# Patient Record
Sex: FEMALE | Race: WHITE | NOT HISPANIC OR LATINO | Employment: OTHER | URBAN - METROPOLITAN AREA
[De-identification: names, ages, dates, MRNs, and addresses within clinical notes are randomized per-mention and may not be internally consistent; named-entity substitution may affect disease eponyms.]

---

## 2018-03-13 RX ORDER — OXYBUTYNIN CHLORIDE 5 MG/1
5 TABLET, EXTENDED RELEASE ORAL NIGHTLY
Qty: 30 TABLET | Refills: 5 | Status: SHIPPED | OUTPATIENT
Start: 2018-03-13 | End: 2018-04-03

## 2018-03-13 RX ORDER — OXYBUTYNIN CHLORIDE 5 MG/1
5 TABLET, EXTENDED RELEASE ORAL
COMMUNITY
Start: 2018-01-23 | End: 2018-03-13 | Stop reason: SDUPTHER

## 2018-03-27 PROBLEM — N39.41 URGE INCONTINENCE: Status: ACTIVE | Noted: 2018-03-27

## 2018-04-03 ENCOUNTER — OFFICE VISIT (OUTPATIENT)
Dept: UROGYNECOLOGY | Facility: CLINIC | Age: 83
End: 2018-04-03
Attending: NURSE PRACTITIONER
Payer: MEDICARE

## 2018-04-03 VITALS
WEIGHT: 138 LBS | DIASTOLIC BLOOD PRESSURE: 84 MMHG | HEIGHT: 62 IN | SYSTOLIC BLOOD PRESSURE: 126 MMHG | BODY MASS INDEX: 25.4 KG/M2

## 2018-04-03 DIAGNOSIS — N39.41 URGE INCONTINENCE: Primary | ICD-10-CM

## 2018-04-03 PROCEDURE — 99212 OFFICE O/P EST SF 10 MIN: CPT | Performed by: NURSE PRACTITIONER

## 2018-04-03 RX ORDER — VALSARTAN 80 MG/1
80 TABLET ORAL
COMMUNITY
End: 2018-10-27 | Stop reason: SDUPTHER

## 2018-04-03 RX ORDER — METOPROLOL TARTRATE AND HYDROCHLOROTHIAZIDE 100; 25 MG/1; MG/1
25 TABLET ORAL 2 TIMES DAILY PRN
COMMUNITY
End: 2018-10-27 | Stop reason: ENTERED-IN-ERROR

## 2018-04-03 RX ORDER — DAPAGLIFLOZIN 10 MG/1
5 TABLET, FILM COATED ORAL DAILY
COMMUNITY
End: 2019-09-27

## 2018-04-03 RX ORDER — ATORVASTATIN CALCIUM 10 MG/1
40 TABLET, FILM COATED ORAL
COMMUNITY
End: 2019-09-27 | Stop reason: SDUPTHER

## 2018-04-03 RX ORDER — OXYBUTYNIN CHLORIDE 5 MG/1
5 TABLET, EXTENDED RELEASE ORAL DAILY
Qty: 30 TABLET | Refills: 5 | Status: SHIPPED | OUTPATIENT
Start: 2018-04-03 | End: 2019-09-27

## 2018-04-03 ASSESSMENT — ENCOUNTER SYMPTOMS
FEVER: 0
JOINT SWELLING: 0
LIGHT-HEADEDNESS: 0
HALLUCINATIONS: 0
SEIZURES: 0
APPETITE CHANGE: 0
FACIAL ASYMMETRY: 0
CHILLS: 0
FATIGUE: 0
DIAPHORESIS: 0
BRUISES/BLEEDS EASILY: 0
DIARRHEA: 0
ABDOMINAL PAIN: 0
NECK PAIN: 0
POLYDIPSIA: 0
NECK STIFFNESS: 0
PALPITATIONS: 0
BREAST MASS: 0
COUGH: 0
CONSTIPATION: 0
WHEEZING: 0
AGITATION: 0
NAUSEA: 0
ADENOPATHY: 0
BREAST PAIN: 0
SHORTNESS OF BREATH: 0

## 2018-04-03 NOTE — ASSESSMENT & PLAN NOTE
Shahrzad SANTOS is doing well on current regimen.     We will continue  Oxybutynin XL 5mg. eRx sent.    Pt teaching done on bladder anatomy/function and bladder retraining techniques. Printed literature reviewed.

## 2018-04-03 NOTE — PROGRESS NOTES
"Patient ID: Shahrzad Feng   : 1928  MRN: [id number]   Visit Date: 4/3/2018    Subjective   Shahrzad Feng is presenting today for Urge Incontinenece      Shahrzad SANOTS feels she is stable since her last visit. She is currently taking Oxybutynin XL 5mg. She reports wearing 1 pads per day, nocturia x0-1, frequency x4, urgency less than once a day.    Vital Signs for this encounter: /84   Ht 1.575 m (5' 2\")   Wt 62.6 kg (138 lb)   BMI 25.24 kg/m²     The following have been marked reviewed and updated as appropriate in this visit:  Tobacco  Allergies  Meds  Problems  Med Hx  Surg Hx  Fam Hx  Soc Hx            Review of Systems   Constitutional: Negative for appetite change, chills, diaphoresis, fatigue and fever.   Respiratory: Negative for cough, shortness of breath and wheezing.    Cardiovascular: Negative for chest pain and palpitations.   Gastrointestinal: Negative for abdominal pain, constipation, diarrhea and nausea.   Endocrine: Negative for polydipsia.   Genitourinary:        See HPI   Breast: Negative for breast discharge, breast mass and breast pain.   Musculoskeletal: Negative for joint swelling, neck pain and neck stiffness.   Skin: Negative for pallor and rash.   Neurological: Negative for seizures, facial asymmetry and light-headedness.   Hematological: Negative for adenopathy. Does not bruise/bleed easily.   Psychiatric/Behavioral: Negative for agitation and hallucinations.     Physical Exam   Constitutional: She appears well-developed and well-nourished.   HENT:   Head: Normocephalic and atraumatic.   Neck: No thyromegaly present.   Cardiovascular: Normal rate, regular rhythm and normal heart sounds.    Pulmonary/Chest: Effort normal.   Skin: Skin is warm and dry.   Psychiatric: She has a normal mood and affect. Her behavior is normal. Judgment and thought content normal.   Vitals reviewed.      Impression & Plan:  Urge incontinence  Shahrzad SANTOS is doing well on current " regimen.     We will continue  Oxybutynin XL 5mg. eRx sent.    Pt teaching done on bladder anatomy/function and bladder retraining techniques. Printed literature reviewed.      Return in about 6 months (around 10/3/2018).    GA Wilson

## 2018-06-18 ENCOUNTER — APPOINTMENT (EMERGENCY)
Dept: RADIOLOGY | Facility: HOSPITAL | Age: 83
End: 2018-06-18
Attending: EMERGENCY MEDICINE
Payer: COMMERCIAL

## 2018-06-18 ENCOUNTER — HOSPITAL ENCOUNTER (EMERGENCY)
Facility: HOSPITAL | Age: 83
Discharge: HOME | End: 2018-06-18
Attending: EMERGENCY MEDICINE
Payer: COMMERCIAL

## 2018-06-18 VITALS
RESPIRATION RATE: 18 BRPM | HEART RATE: 62 BPM | WEIGHT: 131.7 LBS | DIASTOLIC BLOOD PRESSURE: 70 MMHG | BODY MASS INDEX: 24.24 KG/M2 | SYSTOLIC BLOOD PRESSURE: 168 MMHG | HEIGHT: 62 IN | OXYGEN SATURATION: 97 % | TEMPERATURE: 97.8 F

## 2018-06-18 DIAGNOSIS — R07.89 CHEST WALL PAIN: Primary | ICD-10-CM

## 2018-06-18 DIAGNOSIS — S22.20XS CLOSED FRACTURE OF STERNUM, UNSPECIFIED PORTION OF STERNUM, SEQUELA: ICD-10-CM

## 2018-06-18 DIAGNOSIS — S22.42XD CLOSED FRACTURE OF MULTIPLE RIBS OF LEFT SIDE WITH ROUTINE HEALING, SUBSEQUENT ENCOUNTER: ICD-10-CM

## 2018-06-18 PROBLEM — S22.42XA MULTIPLE CLOSED FRACTURES OF RIBS OF LEFT SIDE: Status: ACTIVE | Noted: 2018-06-18

## 2018-06-18 LAB
ALBUMIN SERPL-MCNC: 3.6 G/DL (ref 3.4–5)
ALP SERPL-CCNC: 170 IU/L (ref 35–126)
ALT SERPL-CCNC: 18 IU/L (ref 11–54)
ANION GAP SERPL CALC-SCNC: 9 MEQ/L (ref 3–15)
APTT PPP: 26 SEC. (ref 23–35)
AST SERPL-CCNC: 24 IU/L (ref 15–41)
ATRIAL RATE: 59
BASOPHILS # BLD: 0.02 K/UL (ref 0.01–0.1)
BASOPHILS NFR BLD: 0.3 %
BILIRUB SERPL-MCNC: 0.4 MG/DL (ref 0.3–1.2)
BUN SERPL-MCNC: 13 MG/DL (ref 8–20)
CALCIUM SERPL-MCNC: 8.7 MG/DL (ref 8.9–10.3)
CHLORIDE SERPL-SCNC: 100 MMOL/L (ref 98–109)
CO2 SERPL-SCNC: 25 MMOL/L (ref 22–32)
CREAT SERPL-MCNC: 0.9 MG/DL (ref 0.6–1.1)
DIFFERENTIAL METHOD BLD: NORMAL
EOSINOPHIL # BLD: 0.08 K/UL (ref 0.04–0.36)
EOSINOPHIL NFR BLD: 1.3 %
ERYTHROCYTE [DISTWIDTH] IN BLOOD BY AUTOMATED COUNT: 12.2 % (ref 11.7–14.4)
GFR SERPL CREATININE-BSD FRML MDRD: 59 ML/MIN/1.73M*2
GLUCOSE BLD-MCNC: 321 MG/DL (ref 70–99)
GLUCOSE SERPL-MCNC: 520 MG/DL (ref 70–99)
HCT VFR BLDCO AUTO: 36.5 % (ref 35–45)
HGB BLD-MCNC: 12.7 G/DL (ref 11.8–15.7)
IMM GRANULOCYTES # BLD AUTO: 0.06 K/UL (ref 0–0.08)
IMM GRANULOCYTES NFR BLD AUTO: 1 %
INR PPP: 1 INR
LYMPHOCYTES # BLD: 1.76 K/UL (ref 1.2–3.5)
LYMPHOCYTES NFR BLD: 28.3 %
MCH RBC QN AUTO: 30.2 PG (ref 28–33.2)
MCHC RBC AUTO-ENTMCNC: 34.8 G/DL (ref 32.2–35.5)
MCV RBC AUTO: 86.7 FL (ref 83–98)
MONOCYTES # BLD: 0.44 K/UL (ref 0.28–0.8)
MONOCYTES NFR BLD: 7.1 %
NEUTROPHILS # BLD: 3.87 K/UL (ref 1.7–7)
NEUTS SEG NFR BLD: 62 %
NRBC BLD-RTO: 0 %
P AXIS: -2
PDW BLD AUTO: 11.3 FL (ref 9.4–12.3)
PLATELET # BLD AUTO: 168 K/UL (ref 150–369)
POTASSIUM SERPL-SCNC: 4 MMOL/L (ref 3.6–5.1)
PR INTERVAL: 142
PROT SERPL-MCNC: 6.8 G/DL (ref 6–8.2)
PROTHROMBIN TIME: 13.1 SEC. (ref 12.2–14.5)
QRS DURATION: 94
QT INTERVAL: 446
QTC CALCULATION(BAZETT): 441
R AXIS: 66
RBC # BLD AUTO: 4.21 M/UL (ref 3.93–5.22)
SODIUM SERPL-SCNC: 134 MMOL/L (ref 136–144)
T WAVE AXIS: 87
TROPONIN I SERPL-MCNC: <0.02 NG/ML
VENTRICULAR RATE: 59
WBC # BLD AUTO: 6.23 K/UL (ref 3.8–10.5)

## 2018-06-18 PROCEDURE — 63600000 HC DRUGS/DETAIL CODE: Performed by: EMERGENCY MEDICINE

## 2018-06-18 PROCEDURE — 99284 EMERGENCY DEPT VISIT MOD MDM: CPT | Mod: 25

## 2018-06-18 PROCEDURE — 3E033GC INTRODUCTION OF OTHER THERAPEUTIC SUBSTANCE INTO PERIPHERAL VEIN, PERCUTANEOUS APPROACH: ICD-10-PCS | Performed by: EMERGENCY MEDICINE

## 2018-06-18 PROCEDURE — 85025 COMPLETE CBC W/AUTO DIFF WBC: CPT | Performed by: EMERGENCY MEDICINE

## 2018-06-18 PROCEDURE — 63600105 HC IODINE BASED CONTRAST: Performed by: EMERGENCY MEDICINE

## 2018-06-18 PROCEDURE — 96374 THER/PROPH/DIAG INJ IV PUSH: CPT | Mod: 59

## 2018-06-18 PROCEDURE — 96372 THER/PROPH/DIAG INJ SC/IM: CPT | Mod: 59

## 2018-06-18 PROCEDURE — 85730 THROMBOPLASTIN TIME PARTIAL: CPT | Performed by: EMERGENCY MEDICINE

## 2018-06-18 PROCEDURE — 84484 ASSAY OF TROPONIN QUANT: CPT | Performed by: EMERGENCY MEDICINE

## 2018-06-18 PROCEDURE — 93005 ELECTROCARDIOGRAM TRACING: CPT | Performed by: EMERGENCY MEDICINE

## 2018-06-18 PROCEDURE — 80053 COMPREHEN METABOLIC PANEL: CPT | Performed by: EMERGENCY MEDICINE

## 2018-06-18 PROCEDURE — 36415 COLL VENOUS BLD VENIPUNCTURE: CPT | Performed by: EMERGENCY MEDICINE

## 2018-06-18 PROCEDURE — 71046 X-RAY EXAM CHEST 2 VIEWS: CPT

## 2018-06-18 PROCEDURE — 3E023GC INTRODUCTION OF OTHER THERAPEUTIC SUBSTANCE INTO MUSCLE, PERCUTANEOUS APPROACH: ICD-10-PCS | Performed by: EMERGENCY MEDICINE

## 2018-06-18 PROCEDURE — 85610 PROTHROMBIN TIME: CPT | Performed by: EMERGENCY MEDICINE

## 2018-06-18 PROCEDURE — 71260 CT THORAX DX C+: CPT

## 2018-06-18 PROCEDURE — 4A02X4Z MEASUREMENT OF CARDIAC ELECTRICAL ACTIVITY, EXTERNAL APPROACH: ICD-10-PCS | Performed by: EMERGENCY MEDICINE

## 2018-06-18 RX ORDER — TRAMADOL HYDROCHLORIDE 50 MG/1
50 TABLET ORAL EVERY 8 HOURS PRN
Qty: 20 TABLET | Refills: 0 | Status: SHIPPED | OUTPATIENT
Start: 2018-06-18 | End: 2018-11-12

## 2018-06-18 RX ORDER — MORPHINE SULFATE 2 MG/ML
2 INJECTION, SOLUTION INTRAMUSCULAR; INTRAVENOUS ONCE
Status: COMPLETED | OUTPATIENT
Start: 2018-06-18 | End: 2018-06-18

## 2018-06-18 RX ADMIN — INSULIN HUMAN 12 UNITS: 100 INJECTION, SOLUTION PARENTERAL at 15:41

## 2018-06-18 RX ADMIN — IOHEXOL 100 ML: 350 INJECTION, SOLUTION INTRAVENOUS at 16:39

## 2018-06-18 RX ADMIN — MORPHINE SULFATE 2 MG: 2 INJECTION, SOLUTION INTRAMUSCULAR; INTRAVENOUS at 15:42

## 2018-06-18 ASSESSMENT — ENCOUNTER SYMPTOMS
BACK PAIN: 0
SORE THROAT: 0
SEIZURES: 0
ABDOMINAL PAIN: 0
COLOR CHANGE: 0
HEADACHES: 0
AGITATION: 0
SHORTNESS OF BREATH: 1
COUGH: 0
FEVER: 0
ACTIVITY CHANGE: 0
FACIAL SWELLING: 0
NECK PAIN: 0
HEMATURIA: 0
DIARRHEA: 0
WHEEZING: 0
DIAPHORESIS: 0
WEAKNESS: 0
DIFFICULTY URINATING: 0
VOMITING: 0
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
## 2018-06-28 ENCOUNTER — HOSPITAL ENCOUNTER (OUTPATIENT)
Dept: RADIOLOGY | Facility: HOSPITAL | Age: 83
Discharge: HOME | End: 2018-06-28
Attending: INTERNAL MEDICINE
Payer: COMMERCIAL

## 2018-06-28 ENCOUNTER — TRANSCRIBE ORDERS (OUTPATIENT)
Dept: REGISTRATION | Facility: HOSPITAL | Age: 83
End: 2018-06-28

## 2018-06-28 DIAGNOSIS — Z95.5 PRESENCE OF CORONARY ANGIOPLASTY IMPLANT AND GRAFT: Primary | ICD-10-CM

## 2018-06-28 DIAGNOSIS — I10 ESSENTIAL (PRIMARY) HYPERTENSION: ICD-10-CM

## 2018-06-28 DIAGNOSIS — Z95.5 PRESENCE OF CORONARY ANGIOPLASTY IMPLANT AND GRAFT: ICD-10-CM

## 2018-06-28 PROCEDURE — 71046 X-RAY EXAM CHEST 2 VIEWS: CPT

## 2018-10-09 ENCOUNTER — TRANSCRIBE ORDERS (OUTPATIENT)
Dept: REGISTRATION | Facility: HOSPITAL | Age: 83
End: 2018-10-09

## 2018-10-09 ENCOUNTER — HOSPITAL ENCOUNTER (OUTPATIENT)
Dept: RADIOLOGY | Facility: HOSPITAL | Age: 83
Discharge: HOME | End: 2018-10-09
Attending: NURSE PRACTITIONER
Payer: MEDICARE

## 2018-10-09 DIAGNOSIS — S81.802A OPEN WOUND OF LEFT LOWER LEG: ICD-10-CM

## 2018-10-09 DIAGNOSIS — S81.802A OPEN WOUND OF LEFT LOWER LEG: Primary | ICD-10-CM

## 2018-10-09 PROCEDURE — 73590 X-RAY EXAM OF LOWER LEG: CPT | Mod: LT

## 2018-10-26 ENCOUNTER — TRANSCRIBE ORDERS (OUTPATIENT)
Dept: SCHEDULING | Age: 83
End: 2018-10-26

## 2018-10-26 DIAGNOSIS — M86.162: Primary | ICD-10-CM

## 2018-10-29 ENCOUNTER — TRANSCRIBE ORDERS (OUTPATIENT)
Dept: LAB | Facility: HOSPITAL | Age: 83
End: 2018-10-29

## 2018-10-29 ENCOUNTER — APPOINTMENT (OUTPATIENT)
Dept: LAB | Facility: HOSPITAL | Age: 83
End: 2018-10-29
Attending: SURGERY
Payer: MEDICARE

## 2018-10-29 DIAGNOSIS — M86.162: ICD-10-CM

## 2018-10-29 DIAGNOSIS — M86.162: Primary | ICD-10-CM

## 2018-10-29 LAB
BUN SERPL-MCNC: 17 MG/DL (ref 8–20)
CREAT SERPL-MCNC: 1.2 MG/DL (ref 0.6–1.1)
GFR SERPL CREATININE-BSD FRML MDRD: 42.3 ML/MIN/1.73M*2

## 2018-10-29 PROCEDURE — 84520 ASSAY OF UREA NITROGEN: CPT

## 2018-10-29 PROCEDURE — 82565 ASSAY OF CREATININE: CPT

## 2018-10-29 PROCEDURE — 36415 COLL VENOUS BLD VENIPUNCTURE: CPT

## 2018-10-31 ENCOUNTER — HOSPITAL ENCOUNTER (OUTPATIENT)
Dept: RADIOLOGY | Facility: HOSPITAL | Age: 83
Discharge: HOME | End: 2018-10-31
Attending: SURGERY
Payer: MEDICARE

## 2018-10-31 VITALS — BODY MASS INDEX: 24.69 KG/M2 | WEIGHT: 135 LBS

## 2018-10-31 DIAGNOSIS — M86.162: ICD-10-CM

## 2018-10-31 PROCEDURE — 73720 MRI LWR EXTREMITY W/O&W/DYE: CPT | Mod: LT

## 2018-10-31 PROCEDURE — A9575 INJ GADOTERATE MEGLUMI 0.1ML: HCPCS | Mod: JW | Performed by: SURGERY

## 2018-10-31 PROCEDURE — A9579 GAD-BASE MR CONTRAST NOS,1ML: HCPCS | Mod: JW | Performed by: SURGERY

## 2018-10-31 RX ORDER — GADOTERATE MEGLUMINE 376.9 MG/ML
0.1 INJECTION INTRAVENOUS ONCE
Status: COMPLETED | OUTPATIENT
Start: 2018-10-31 | End: 2018-10-31

## 2018-10-31 RX ADMIN — GADOTERATE MEGLUMINE 12 ML: 376.9 INJECTION INTRAVENOUS at 13:20

## 2018-11-09 ENCOUNTER — HOSPITAL ENCOUNTER (EMERGENCY)
Facility: HOSPITAL | Age: 83
Discharge: HOME | End: 2018-11-09
Attending: EMERGENCY MEDICINE
Payer: MEDICARE

## 2018-11-09 VITALS
SYSTOLIC BLOOD PRESSURE: 154 MMHG | TEMPERATURE: 97.5 F | DIASTOLIC BLOOD PRESSURE: 70 MMHG | WEIGHT: 133 LBS | HEART RATE: 82 BPM | OXYGEN SATURATION: 100 % | HEIGHT: 62 IN | RESPIRATION RATE: 16 BRPM | BODY MASS INDEX: 24.48 KG/M2

## 2018-11-09 DIAGNOSIS — M79.605 LEG PAIN, ANTERIOR, LEFT: Primary | ICD-10-CM

## 2018-11-09 PROCEDURE — 96374 THER/PROPH/DIAG INJ IV PUSH: CPT

## 2018-11-09 PROCEDURE — 63600000 HC DRUGS/DETAIL CODE: Performed by: PHYSICIAN ASSISTANT

## 2018-11-09 PROCEDURE — 63700000 HC SELF-ADMINISTRABLE DRUG: Performed by: PHYSICIAN ASSISTANT

## 2018-11-09 PROCEDURE — 99284 EMERGENCY DEPT VISIT MOD MDM: CPT | Mod: 25

## 2018-11-09 PROCEDURE — 3E033NZ INTRODUCTION OF ANALGESICS, HYPNOTICS, SEDATIVES INTO PERIPHERAL VEIN, PERCUTANEOUS APPROACH: ICD-10-PCS | Performed by: EMERGENCY MEDICINE

## 2018-11-09 RX ORDER — OXYCODONE AND ACETAMINOPHEN 5; 325 MG/1; MG/1
1 TABLET ORAL ONCE
Status: COMPLETED | OUTPATIENT
Start: 2018-11-09 | End: 2018-11-09

## 2018-11-09 RX ORDER — OXYCODONE AND ACETAMINOPHEN 5; 325 MG/1; MG/1
TABLET ORAL
Status: DISCONTINUED
Start: 2018-11-09 | End: 2018-11-10 | Stop reason: HOSPADM

## 2018-11-09 RX ORDER — LORAZEPAM 2 MG/ML
0.5 INJECTION INTRAMUSCULAR ONCE
Status: COMPLETED | OUTPATIENT
Start: 2018-11-09 | End: 2018-11-09

## 2018-11-09 RX ORDER — CYCLOBENZAPRINE HCL 10 MG
10 TABLET ORAL ONCE
Status: COMPLETED | OUTPATIENT
Start: 2018-11-09 | End: 2018-11-09

## 2018-11-09 RX ORDER — CYCLOBENZAPRINE HCL 10 MG
10 TABLET ORAL 3 TIMES DAILY PRN
Qty: 15 TABLET | Refills: 0 | Status: SHIPPED | OUTPATIENT
Start: 2018-11-09 | End: 2019-09-27

## 2018-11-09 RX ORDER — CYCLOBENZAPRINE HCL 10 MG
TABLET ORAL
Status: DISCONTINUED
Start: 2018-11-09 | End: 2018-11-10 | Stop reason: HOSPADM

## 2018-11-09 RX ADMIN — LORAZEPAM 0.5 MG: 2 INJECTION INTRAMUSCULAR; INTRAVENOUS at 22:14

## 2018-11-09 RX ADMIN — CYCLOBENZAPRINE HYDROCHLORIDE 10 MG: 10 TABLET, FILM COATED ORAL at 23:02

## 2018-11-09 RX ADMIN — OXYCODONE HYDROCHLORIDE AND ACETAMINOPHEN 1 TABLET: 5; 325 TABLET ORAL at 23:02

## 2018-11-09 ASSESSMENT — ENCOUNTER SYMPTOMS
VOMITING: 0
BACK PAIN: 0
SHORTNESS OF BREATH: 0
CHILLS: 0
ABDOMINAL PAIN: 0
FEVER: 0
NAUSEA: 0
NECK PAIN: 0
WOUND: 1
HEADACHES: 0

## 2018-11-10 NOTE — ED ATTESTATION NOTE
The patient was evaluated and managed by the physician assistant / nurse practitioner.       Jazmin Corral,   11/09/18 3763

## 2018-11-10 NOTE — DISCHARGE INSTRUCTIONS
RETURN TO THE ER IF WORSE  Follow up with primary care doctor as soon as possible  Take medications as prescribed

## 2018-11-10 NOTE — ED PROVIDER NOTES
"HPI     Chief Complaint   Patient presents with   • Leg Pain       89-year-old female complaining of left lower anterior leg pain.  Patient reports she has a wound to left anterior leg that she sees wound care for but reports she is having sharp shooting pains, \"like nerve pains in spasm\" to the area around the wound. Taking percocet 5-325mg q6h, tylenol and advil at home without relief of this new spasm-like pain. Pt reports she thinks it is nerve pain because skin to L lower leg around wound is very sensitive to the touch. Denies calf pain, thigh pain, knee pain, hip pain, back pain. Denies erythema, warmth, f/c, swelling, drainage to wound. Reports \"they were digging around in it a lot at wound care center the other day\".              Patient History     Past Medical History:   Diagnosis Date   • Breast cancer (CMS/[facility]) ([facility])    • DUB (dysfunctional uterine bleeding)    • GERD (gastroesophageal reflux disease)    • Glaucoma    • Macular degeneration    • MI (myocardial infarction) (CMS/[facility]) ([facility])    • Type 2 diabetes mellitus (CMS/[facility]) ([facility])        Past Surgical History:   Procedure Laterality Date   • APPENDECTOMY     • BREAST LUMPECTOMY Left 2015   • CORONARY ANGIOPLASTY WITH STENT PLACEMENT     • HYSTERECTOMY      subtotal       Family History   Problem Relation Age of Onset   • Colon cancer Mother        Social History   Substance Use Topics   • Smoking status: Never Smoker   • Smokeless tobacco: Never Used   • Alcohol use No       Systems Reviewed from Nursing Triage:  Tobacco  Allergies  Meds  Problems  Med Hx  Surg Hx  Soc Hx         Review of Systems     Review of Systems   Constitutional: Negative for chills and fever.   Eyes: Negative for visual disturbance.   Respiratory: Negative for shortness of breath.    Cardiovascular: Negative for chest pain.   Gastrointestinal: Negative for abdominal pain, nausea and vomiting.   Musculoskeletal: Negative for back pain and neck pain.   Skin: Positive for " "wound. Negative for rash.   Neurological: Negative for syncope and headaches.        Physical Exam     ED Triage Vitals [11/09/18 2128]   Temp Heart Rate Resp BP SpO2   36.4 °C (97.5 °F) 82 16 (!) 154/70 100 %      Temp src Heart Rate Source Patient Position BP Location FiO2 (%) (Set)   -- -- -- -- --                     Patient Vitals for the past 24 hrs:   BP Temp Pulse Resp SpO2 Height Weight   11/09/18 2128 (!) 154/70 36.4 °C (97.5 °F) 82 16 100 % 1.575 m (5' 2\") 60.3 kg (133 lb)           Physical Exam   Constitutional: She is oriented to person, place, and time. She appears well-developed and well-nourished.   HENT:   Head: Normocephalic and atraumatic.   Eyes: Conjunctivae are normal. Pupils are equal, round, and reactive to light.   Neck: Normal range of motion. Neck supple.   Cardiovascular: Intact distal pulses.    Pulmonary/Chest: Effort normal. No respiratory distress.   Musculoskeletal:        Legs:  Neurological: She is alert and oriented to person, place, and time. No cranial nerve deficit or sensory deficit.   Skin: Skin is warm and dry. Capillary refill takes less than 2 seconds.   Psychiatric: She has a normal mood and affect.   Nursing note and vitals reviewed.           Procedures    ED Course & MDM     Labs Reviewed - No data to display    No orders to display               MDM           ED Course as of Nov 12 1140 Fri Nov 09, 2018   2246 Pt reports pain temporarily improved. Still with some spasms. Completely aaox3. Does not feel dizzy or sleepy after meds. Pt's daughter reports she is due for her percocet. Discussed proper med use, need for f/u and return precautions, pt and her daughter, whom she lives with, verb understanding.     [ET]      ED Course User Index  [ET] Vivian Diaz PA C         Clinical Impressions as of Nov 12 1140   Leg pain, anterior, left        Vivian Diaz PA C  11/09/18 2302       Vivian Diaz PA C  11/12/18 1140    "

## 2018-11-12 ENCOUNTER — OFFICE VISIT (OUTPATIENT)
Dept: WOUND CARE | Facility: HOSPITAL | Age: 83
End: 2018-11-12
Attending: PLASTIC SURGERY
Payer: MEDICARE

## 2018-11-12 DIAGNOSIS — S81.812A LACERATION OF LEFT LOWER EXTREMITY, INITIAL ENCOUNTER: Primary | ICD-10-CM

## 2018-11-12 PROCEDURE — 99212 OFFICE O/P EST SF 10 MIN: CPT | Performed by: PLASTIC SURGERY

## 2018-11-12 PROCEDURE — G0463 HOSPITAL OUTPT CLINIC VISIT: HCPCS

## 2018-11-12 PROCEDURE — G0463 HOSPITAL OUTPT CLINIC VISIT: HCPCS | Performed by: PLASTIC SURGERY

## 2018-11-12 RX ORDER — LINEZOLID 600 MG/1
600 TABLET, FILM COATED ORAL 2 TIMES DAILY
COMMUNITY
End: 2019-09-27

## 2018-11-12 RX ORDER — MUPIROCIN 20 MG/G
OINTMENT TOPICAL
Refills: 0 | COMMUNITY
Start: 2018-11-08 | End: 2018-12-10 | Stop reason: SDUPTHER

## 2018-11-12 RX ORDER — LINEZOLID 600 MG/1
600 TABLET, FILM COATED ORAL
Refills: 0 | COMMUNITY
Start: 2018-11-09 | End: 2018-11-12

## 2018-11-12 RX ORDER — LIDOCAINE HYDROCHLORIDE 20 MG/ML
JELLY TOPICAL
Refills: 2 | COMMUNITY
Start: 2018-11-02

## 2018-11-12 RX ORDER — OXYCODONE AND ACETAMINOPHEN 5; 325 MG/1; MG/1
1 TABLET ORAL EVERY 4 HOURS PRN
COMMUNITY
End: 2019-09-27

## 2018-11-12 RX ORDER — OXYCODONE AND ACETAMINOPHEN 5; 325 MG/1; MG/1
1 TABLET ORAL EVERY 6 HOURS PRN
Refills: 0 | COMMUNITY
Start: 2018-11-05 | End: 2018-11-12

## 2018-11-12 ASSESSMENT — ENCOUNTER SYMPTOMS: WOUND: 1

## 2018-11-12 NOTE — PATIENT INSTRUCTIONS
W[facility] Healing Center Instructions    MEDICATIONS     Medication Note  Continue present medications as prescribed by the [facility]aling Center or other physicians you see. To avoid any problems keep the [facility]aling Center informed each visit of any medications changes that occur.   Please call Dr Gonzalez office to discuss pain management.  Recommend gabapentin or lyrica, if medically appropriate, and discontinuation of oxycodone.  WOUND CARE     Clean Wound with: Soap and Water and Showering.  May get wound wet.  Do not scrub wound but allow soap and water to run over wound. Pat dry thoroughly.   Treatment 1   Location: left anterior leg  Dressing: Apply Mupirocin, adaptic, 4x4 gauze and matthew wrap.  Dressing Care Frequency: 3x per Week  Care Provider: Visiting Nurse       Basic Principles      • Wash your hands thoroughly with soap and water and after each dressing change. If someone other than the patient changes the dressing, it’s best to wear disposable gloves.   • Do not get the wound or dressing wet.   • To shower: remove the dressing, shower with soap and water (including washing the wound - do not use a washcloth), air dry, then redress the wound.  • Do not take a tub bath  • Keep all your dressings in a clean, covered container at home to avoid dust and contamination.  • Discard used dressings in a plastic bag or covered trash container.  • Check your wound and the surrounding skin at each dressing change for redness, warmth, swelling, increased pain, foul odor, fever, pus or abnormal drainage or discharge.  • Notify Wound Healing Center if any of these changes occur - Dept: [phone].        Nutrition  • Eat a well, balanced diet with adequate protein to support wound healing.   • Take a multivitamin every day. Adequate nutrition supports healing and new tissue growth.  • All diabetic patients should strive to keep blood sugars within a normal, practical range.   • Elevated blood sugars can delay your  wound healing.        ACTIVITY     Normal activity then rest and elevation  May shower  May walk    MOBILITY     independent

## 2018-11-12 NOTE — PROGRESS NOTES
Patient ID: Shahrzad Feng                              : 1928  MRN: [id number]                                            Visit Date: 2018  Encounter Provider: ALEXANDRIA Beltrán  Referring Provider: No ref. provider found    Subjective      HPI  Shahrzad Feng is a 89 y.o. old female with a chief compliant of Traumatic Wound   presenting today for : Treatment of traumatic left leg wound.  She fell out of bed 1 week ago, injuring her left leg.  She has been treated with Silvadene and now mupirocin.  She had aggressive debridement last Friday at [facility].  She was dissatisfied with that care and comes here.    The pain is better today    The following have been reviewed and updated as appropriate in this visit:       Past Medical History:  has a past medical history of Breast cancer (CMS/HCC) ([facility]); DUB (dysfunctional uterine bleeding); GERD (gastroesophageal reflux disease); Glaucoma; Macular degeneration; MI (myocardial infarction) (CMS/HCC) ([facility]); and Type 2 diabetes mellitus (CMS/HCC) ([facility]).  Past Surgical History:  has a past surgical history that includes Hysterectomy; Breast lumpectomy (Left, 2015); Appendectomy; and Coronary angioplasty with stent.  Social History:  reports that she has never smoked. She has never used smokeless tobacco. She reports that she does not drink alcohol or use drugs.  Family History: family history includes Colon cancer in her mother.  Medications:   Current Outpatient Prescriptions:   •  atorvastatin (LIPITOR) 10 mg tablet, 20 mg., Disp: , Rfl:   •  cyclobenzaprine (FLEXERIL) 10 mg tablet, Take 1 tablet (10 mg total) by mouth 3 (three) times a day as needed for muscle spasms. No driving or operating heavy machinery if taking., Disp: 15 tablet, Rfl: 0  •  dapagliflozin (FARXIGA) 10 mg tablet, 5 mg daily. , Disp: , Rfl:   •  lidocaine (XYLOCAINE) 2 % jelly, APPLY TO LEFT LEG WOUND DAILY AS DIRECTED, Disp: , Rfl: 2  •  metoprolol tartrate  (LOPRESSOR) 25 mg tablet, Take 25 mg by mouth 2 (two) times a day., Disp: , Rfl:   •  mupirocin (BACTROBAN) 2 % ointment, APPLY TO AFFECTED AREA TWICE A DAY, Disp: , Rfl: 0  •  oxybutynin XL (DITROPAN-XL) 5 mg 24 hr tablet, Take 1 tablet (5 mg total) by mouth daily. For bladder symptoms, Disp: 30 tablet, Rfl: 5  •  pantoprazole (PROTONIX) 40 mg EC tablet, Take 40 mg by mouth daily., Disp: , Rfl:   •  sitaGLIPtin-metformin (JANUMET) 50-1,000 mg per tablet, daily. JANUMET  100/1000 MG , Disp: , Rfl:   •  valsartan (DIOVAN) 80 mg tablet, Take 80 mg by mouth daily., Disp: , Rfl:     Allergies: is allergic to bacitracin; ciprofloxacin; and penicillin.     Review of Systems   Skin: Positive for wound.   All other systems reviewed and are negative.    Objective   There were no vitals taken for this visit.    Physical Exam   Constitutional: She is oriented to person, place, and time. She appears well-developed and well-nourished.   HENT:   Head: Normocephalic.   Eyes: EOM are normal. Pupils are equal, round, and reactive to light.   Neck: Neck supple.   Cardiovascular: Normal rate.    Pulmonary/Chest: Effort normal.   Musculoskeletal: Normal range of motion. She exhibits tenderness.   Neurological: She is alert and oriented to person, place, and time.   Skin: Skin is warm.   Psychiatric: She has a normal mood and affect.     Relatively clean wound of anterior left leg.  No periwound erythema.  No swelling or calf pain    Assessment/Plan    Diagnosis Plan   1. Laceration of left lower extremity, initial encounter       Problem List Items Addressed This Visit     Laceration of left lower extremity - Primary      Left traumatic, anterior leg wound will be treated with daily dressing changes of mupirocin and gauze    No Follow-up on file.     ALEXANDRIA Beltrán MD

## 2018-11-19 ENCOUNTER — OFFICE VISIT (OUTPATIENT)
Dept: WOUND CARE | Facility: HOSPITAL | Age: 83
End: 2018-11-19
Attending: PLASTIC SURGERY
Payer: MEDICARE

## 2018-11-19 VITALS — TEMPERATURE: 98.4 F

## 2018-11-19 DIAGNOSIS — S81.812A LACERATION OF LEFT LOWER EXTREMITY, INITIAL ENCOUNTER: Primary | ICD-10-CM

## 2018-11-19 PROCEDURE — G0463 HOSPITAL OUTPT CLINIC VISIT: HCPCS

## 2018-11-19 PROCEDURE — 99213 OFFICE O/P EST LOW 20 MIN: CPT | Performed by: PLASTIC SURGERY

## 2018-11-19 RX ORDER — GABAPENTIN 100 MG/1
CAPSULE ORAL AS NEEDED
Refills: 1 | COMMUNITY
Start: 2018-11-13

## 2018-11-19 ASSESSMENT — ENCOUNTER SYMPTOMS: WOUND: 1

## 2018-11-19 NOTE — PROGRESS NOTES
Patient ID: Shahrzad Feng                              : 1928  MRN: [id number]                                            Visit Date: 2018  Encounter Provider: ALEXANDRIA Beltrán  Referring Provider: No ref. provider found    Subjective      HPI  Shahrzad Feng is a 89 y.o. old female with a chief compliant of No chief complaint on file.   presenting today for : Treatment of traumatic left leg wound with daily dressing changes of mupirocin paste    The following have been reviewed and updated as appropriate in this visit:       Past Medical History:  has a past medical history of Breast cancer (CMS/HCC) ([facility]); DUB (dysfunctional uterine bleeding); GERD (gastroesophageal reflux disease); Glaucoma; Macular degeneration; MI (myocardial infarction) (CMS/HCC) ([facility]); and Type 2 diabetes mellitus (CMS/HCC) ([facility]).  Past Surgical History:  has a past surgical history that includes Hysterectomy; Breast lumpectomy (Left, 2015); Appendectomy; and Coronary angioplasty with stent.  Social History:  reports that she has never smoked. She has never used smokeless tobacco. She reports that she does not drink alcohol or use drugs.  Family History: family history includes Colon cancer in her mother.  Medications:   Current Outpatient Prescriptions:   •  atorvastatin (LIPITOR) 10 mg tablet, 20 mg., Disp: , Rfl:   •  cyclobenzaprine (FLEXERIL) 10 mg tablet, Take 1 tablet (10 mg total) by mouth 3 (three) times a day as needed for muscle spasms. No driving or operating heavy machinery if taking., Disp: 15 tablet, Rfl: 0  •  dapagliflozin (FARXIGA) 10 mg tablet, 5 mg daily. , Disp: , Rfl:   •  lidocaine (XYLOCAINE) 2 % jelly, APPLY TO LEFT LEG WOUND DAILY AS DIRECTED, Disp: , Rfl: 2  •  linezolid (ZYVOX) 600 mg tablet, Take 600 mg by mouth 2 (two) times a day., Disp: , Rfl:   •  metoprolol tartrate (LOPRESSOR) 25 mg tablet, Take 25 mg by mouth 2 (two) times a day., Disp: , Rfl:   •  mupirocin (BACTROBAN) 2 %  ointment, APPLY TO AFFECTED AREA TWICE A DAY, Disp: , Rfl: 0  •  oxybutynin XL (DITROPAN-XL) 5 mg 24 hr tablet, Take 1 tablet (5 mg total) by mouth daily. For bladder symptoms, Disp: 30 tablet, Rfl: 5  •  oxyCODONE-acetaminophen (PERCOCET) 5-325 mg per tablet, Take 1 tablet by mouth every 4 (four) hours as needed for moderate pain., Disp: , Rfl:   •  pantoprazole (PROTONIX) 40 mg EC tablet, Take 40 mg by mouth daily., Disp: , Rfl:   •  sitaGLIPtin-metformin (JANUMET) 50-1,000 mg per tablet, daily. JANUMET  100/1000 MG , Disp: , Rfl:   •  valsartan (DIOVAN) 80 mg tablet, Take 80 mg by mouth daily., Disp: , Rfl:     Allergies: is allergic to bacitracin; ciprofloxacin; and penicillin.     Review of Systems   Skin: Positive for wound.   All other systems reviewed and are negative.    Objective   There were no vitals taken for this visit.    Physical Exam   Constitutional: She is oriented to person, place, and time. She appears well-developed and well-nourished.   HENT:   Head: Normocephalic.   Eyes: Conjunctivae are normal. Pupils are equal, round, and reactive to light.   Neck: Neck supple.   Cardiovascular: Normal rate and intact distal pulses.    Pulmonary/Chest: Effort normal.   Abdominal: Soft.   Musculoskeletal: Normal range of motion.   Neurological: She is alert and oriented to person, place, and time.   Skin: Skin is warm.   Psychiatric: She has a normal mood and affect.     Wounds are clean up very well in the anterior left leg wounds.  No sign of infection      Assessment/Plan    Diagnosis Plan   1. Laceration of left lower extremity, initial encounter       Problem List Items Addressed This Visit     Laceration of left lower extremity - Primary      Continue with daily dressing changes of antibiotic ointment    No Follow-up on file.     ALEXANDRIA Beltrán MD

## 2018-11-19 NOTE — PATIENT INSTRUCTIONS
Wound Healing Center Instructions    MEDICATIONS     Medication Note  Continue present medications as prescribed by the Wound Healing Center or other physicians you see. To avoid any problems keep the Wound Healing Center informed each visit of any medications changes that occur.   Please call Dr Gonzalez office to discuss pain management.  Recommend gabapentin or lyrica, if medically appropriate, and discontinuation of oxycodone.  WOUND CARE     Clean Wound with: Soap and Water and Showering.  May get wound wet.  Do not scrub wound but allow soap and water to run over wound. Pat dry thoroughly.   Treatment 1   Location: left anterior leg  Dressing: Apply Mupirocin, adaptic, 4x4 gauze and matthew wrap.  Dressing Care Frequency: 3x per Week  Care Provider: Visiting Nurse       Basic Principles      • Wash your hands thoroughly with soap and water and after each dressing change. If someone other than the patient changes the dressing, it’s best to wear disposable gloves.   • Do not get the wound or dressing wet.   • To shower: remove the dressing, shower with soap and water (including washing the wound - do not use a washcloth), air dry, then redress the wound.  • Do not take a tub bath  • Keep all your dressings in a clean, covered container at home to avoid dust and contamination.  • Discard used dressings in a plastic bag or covered trash container.  • Check your wound and the surrounding skin at each dressing change for redness, warmth, swelling, increased pain, foul odor, fever, pus or abnormal drainage or discharge.  • Notify Wound Healing Center if any of these changes occur - Dept: [phone].        Nutrition  • Eat a well, balanced diet with adequate protein to support wound healing.   • Take a multivitamin every day. Adequate nutrition supports healing and new tissue growth.  • All diabetic patients should strive to keep blood sugars within a normal, practical range.   • Elevated blood sugars can delay your  wound healing.        ACTIVITY     Normal activity then rest and elevation  May shower  May walk    MOBILITY     independent

## 2018-12-10 ENCOUNTER — OFFICE VISIT (OUTPATIENT)
Dept: WOUND CARE | Facility: HOSPITAL | Age: 83
End: 2018-12-10
Attending: PLASTIC SURGERY
Payer: MEDICARE

## 2018-12-10 VITALS — RESPIRATION RATE: 16 BRPM | TEMPERATURE: 98 F | HEART RATE: 68 BPM

## 2018-12-10 DIAGNOSIS — S81.812D LACERATION OF LEFT LOWER EXTREMITY, SUBSEQUENT ENCOUNTER: Primary | ICD-10-CM

## 2018-12-10 PROCEDURE — G0463 HOSPITAL OUTPT CLINIC VISIT: HCPCS

## 2018-12-10 PROCEDURE — 99212 OFFICE O/P EST SF 10 MIN: CPT | Performed by: PLASTIC SURGERY

## 2018-12-10 RX ORDER — MUPIROCIN 20 MG/G
OINTMENT TOPICAL DAILY
Qty: 22 G | Refills: 2 | Status: SHIPPED | OUTPATIENT
Start: 2018-12-10 | End: 2018-12-24

## 2018-12-10 ASSESSMENT — ENCOUNTER SYMPTOMS: WOUND: 1

## 2018-12-10 NOTE — PATIENT INSTRUCTIONS
Wound Healing Center Instructions    MEDICATIONS     Medication Note  Continue present medications as prescribed by the Wound Healing Center or other physicians you see. To avoid any problems keep the Wound Healing Center informed each visit of any medications changes that occur.   Please call Dr Gonzalez office to discuss pain management.  Recommend gabapentin or lyrica, if medically appropriate, and discontinuation of oxycodone.  WOUND CARE     Clean Wound with: Soap and Water and Showering.  May get wound wet.   Treatment 1   Location: left anterior leg  Dressing: Apply Mupirocin, SINGLE LAYER adaptic, 4x4 gauze and matthew wrap.  Dressing Care Frequency: 3x per Week  Care Provider: Visiting Nurse       Basic Principles      • Wash your hands thoroughly with soap and water and after each dressing change. If someone other than the patient changes the dressing, it’s best to wear disposable gloves.   • Do not get the wound or dressing wet.   • To shower: remove the dressing, shower with soap and water (including washing the wound - do not use a washcloth), air dry, then redress the wound.  • Do not take a tub bath  • Keep all your dressings in a clean, covered container at home to avoid dust and contamination.  • Discard used dressings in a plastic bag or covered trash container.  • Check your wound and the surrounding skin at each dressing change for redness, warmth, swelling, increased pain, foul odor, fever, pus or abnormal drainage or discharge.  • Notify Wound Healing Center if any of these changes occur - Dept: [phone].        Nutrition  • Eat a well, balanced diet with adequate protein to support wound healing.   • Take a multivitamin every day. Adequate nutrition supports healing and new tissue growth.  • All diabetic patients should strive to keep blood sugars within a normal, practical range.   • Elevated blood sugars can delay your wound healing.        ACTIVITY     Normal activity then rest and  elevation  May shower  May walk    MOBILITY     independent

## 2018-12-10 NOTE — PROGRESS NOTES
Patient ID: Shahrzad Feng                              : 1928  MRN: [id number]                                            Visit Date: 12/10/2018  Encounter Provider: ALEXANDRIA Beltrán  Referring Provider: No ref. provider found    Subjective      HPI  Shahrzad Feng is a 89 y.o. old female with a chief compliant of No chief complaint on file.   presenting today for: Treatment of traumatic left leg wound with mupirocin ointment    The following have been reviewed and updated as appropriate in this visit:       Past Medical History:  has a past medical history of Breast cancer (CMS/HCC) ([facility]); DUB (dysfunctional uterine bleeding); GERD (gastroesophageal reflux disease); Glaucoma; Macular degeneration; MI (myocardial infarction) (CMS/HCC) ([facility]); and Type 2 diabetes mellitus (CMS/HCC) ([facility]).  Past Surgical History:  has a past surgical history that includes Hysterectomy; Breast lumpectomy (Left, 2015); Appendectomy; and Coronary angioplasty with stent.  Social History:  reports that she has never smoked. She has never used smokeless tobacco. She reports that she does not drink alcohol or use drugs.  Family History: family history includes Colon cancer in her mother.  Medications:   Current Outpatient Prescriptions:   •  atorvastatin (LIPITOR) 10 mg tablet, 20 mg., Disp: , Rfl:   •  cyclobenzaprine (FLEXERIL) 10 mg tablet, Take 1 tablet (10 mg total) by mouth 3 (three) times a day as needed for muscle spasms. No driving or operating heavy machinery if taking., Disp: 15 tablet, Rfl: 0  •  dapagliflozin (FARXIGA) 10 mg tablet, 5 mg daily. , Disp: , Rfl:   •  gabapentin (NEURONTIN) 100 mg capsule, TAKE 1 CAPSULE ORALLY 3 TIMES A DAY AS NEEDED, Disp: , Rfl: 1  •  lidocaine (XYLOCAINE) 2 % jelly, APPLY TO LEFT LEG WOUND DAILY AS DIRECTED, Disp: , Rfl: 2  •  linezolid (ZYVOX) 600 mg tablet, Take 600 mg by mouth 2 (two) times a day., Disp: , Rfl:   •  metoprolol tartrate (LOPRESSOR) 25 mg tablet, Take 25 mg  by mouth 2 (two) times a day., Disp: , Rfl:   •  mupirocin (BACTROBAN) 2 % ointment, APPLY TO AFFECTED AREA TWICE A DAY, Disp: , Rfl: 0  •  oxybutynin XL (DITROPAN-XL) 5 mg 24 hr tablet, Take 1 tablet (5 mg total) by mouth daily. For bladder symptoms, Disp: 30 tablet, Rfl: 5  •  oxyCODONE-acetaminophen (PERCOCET) 5-325 mg per tablet, Take 1 tablet by mouth every 4 (four) hours as needed for moderate pain., Disp: , Rfl:   •  pantoprazole (PROTONIX) 40 mg EC tablet, Take 40 mg by mouth daily., Disp: , Rfl:   •  sitaGLIPtin-metformin (JANUMET) 50-1,000 mg per tablet, daily. JANUMET  100/1000 MG , Disp: , Rfl:   •  valsartan (DIOVAN) 80 mg tablet, Take 80 mg by mouth daily., Disp: , Rfl:     Allergies: is allergic to bacitracin; ciprofloxacin; and penicillin.     Review of Systems   Skin: Positive for wound.   All other systems reviewed and are negative.    Objective   There were no vitals taken for this visit.    Physical Exam   Constitutional: She is oriented to person, place, and time. She appears well-developed and well-nourished.   HENT:   Head: Normocephalic.   Eyes: Conjunctivae are normal. Pupils are equal, round, and reactive to light.   Neck: Neck supple.   Cardiovascular: Normal rate.    Pulmonary/Chest: Effort normal.   Abdominal: Soft.   Musculoskeletal: Normal range of motion.   Neurological: She is alert and oriented to person, place, and time.   Skin: Skin is warm.   Psychiatric: She has a normal mood and affect.     Left anterior leg wound before debridement    Wound after debridement.  Almost healed      Assessment/Plan    Diagnosis Plan   1. Laceration of left lower extremity, subsequent encounter       Problem List Items Addressed This Visit     Laceration of left lower extremity - Primary      Left anterior leg wound treated with mupirocin ointment    No Follow-up on file.     ALEXANDRIA Beltrán MD

## 2018-12-31 ENCOUNTER — OFFICE VISIT (OUTPATIENT)
Dept: WOUND CARE | Facility: HOSPITAL | Age: 83
End: 2018-12-31
Attending: PLASTIC SURGERY
Payer: MEDICARE

## 2018-12-31 DIAGNOSIS — S81.812D LACERATION OF LEFT LOWER EXTREMITY, SUBSEQUENT ENCOUNTER: Primary | ICD-10-CM

## 2018-12-31 PROCEDURE — G0463 HOSPITAL OUTPT CLINIC VISIT: HCPCS

## 2018-12-31 PROCEDURE — A6212 FOAM DRG <=16 SQ IN W/BORDER: HCPCS

## 2018-12-31 PROCEDURE — 99213 OFFICE O/P EST LOW 20 MIN: CPT | Performed by: PLASTIC SURGERY

## 2018-12-31 ASSESSMENT — ENCOUNTER SYMPTOMS: WOUND: 1

## 2018-12-31 NOTE — PATIENT INSTRUCTIONS
Wound Healing Center Instructions    MEDICATIONS     Medication Note  Continue present medications as prescribed by the Wound Healing Center or other physicians you see. To avoid any problems keep the Wound Healing Center informed each visit of any medications changes that occur.   Please call Dr Gonzalez office to discuss pain management.  Recommend gabapentin or lyrica, if medically appropriate, and discontinuation of oxycodone.  WOUND CARE     Clean Wound with: Soap and Water and Showering.  May get wound wet.   Treatment 1   Location: left anterior leg  Dressing: Apply Mupirocin, and a bandage for one-2 more weeks.  Dressing Care Frequency: 3x per Week  Care Provider: family    MAY RESUME PHYSICAL THERAPY FROM A WOUND CARE PERSPECTIVE.         Basic Principles      • Wash your hands thoroughly with soap and water and after each dressing change. If someone other than the patient changes the dressing, it’s best to wear disposable gloves.   • Do not get the wound or dressing wet.   • To shower: remove the dressing, shower with soap and water (including washing the wound - do not use a washcloth), air dry, then redress the wound.  • Do not take a tub bath  • Keep all your dressings in a clean, covered container at home to avoid dust and contamination.  • Discard used dressings in a plastic bag or covered trash container.  • Check your wound and the surrounding skin at each dressing change for redness, warmth, swelling, increased pain, foul odor, fever, pus or abnormal drainage or discharge.  • Notify Wound Healing Center if any of these changes occur - Dept: [phone].        Nutrition  • Eat a well, balanced diet with adequate protein to support wound healing.   • Take a multivitamin every day. Adequate nutrition supports healing and new tissue growth.  • All diabetic patients should strive to keep blood sugars within a normal, practical range.   • Elevated blood sugars can delay your wound healing.        ACTIVITY      Normal activity then rest and elevation  May shower  May walk    MOBILITY     independent

## 2018-12-31 NOTE — PROGRESS NOTES
Patient ID: Shahrzad Feng                              : 1928  MRN: [id number]                                            Visit Date: 2018  Encounter Provider: ALEXANDRIA Beltrán  Referring Provider: No ref. provider found    Subjective      HPI  Shahrzad Feng is a 89 y.o. old female with a chief compliant of No chief complaint on file.   presenting today for: Treatment traumatic left leg wound with mupirocin ointment    The following have been reviewed and updated as appropriate in this visit:       Past Medical History:  has a past medical history of Breast cancer (CMS/HCC) ([facility]); DUB (dysfunctional uterine bleeding); GERD (gastroesophageal reflux disease); Glaucoma; Macular degeneration; MI (myocardial infarction) (CMS/HCC) ([facility]); and Type 2 diabetes mellitus (CMS/HCC) ([facility]).  Past Surgical History:  has a past surgical history that includes Hysterectomy; Breast lumpectomy (Left, 2015); Appendectomy; and Coronary angioplasty with stent.  Social History:  reports that she has never smoked. She has never used smokeless tobacco. She reports that she does not drink alcohol or use drugs.  Family History: family history includes Colon cancer in her mother.  Medications:   Current Outpatient Prescriptions:   •  atorvastatin (LIPITOR) 10 mg tablet, 20 mg., Disp: , Rfl:   •  cyclobenzaprine (FLEXERIL) 10 mg tablet, Take 1 tablet (10 mg total) by mouth 3 (three) times a day as needed for muscle spasms. No driving or operating heavy machinery if taking., Disp: 15 tablet, Rfl: 0  •  dapagliflozin (FARXIGA) 10 mg tablet, 5 mg daily. , Disp: , Rfl:   •  gabapentin (NEURONTIN) 100 mg capsule, TAKE 1 CAPSULE ORALLY 3 TIMES A DAY AS NEEDED, Disp: , Rfl: 1  •  lidocaine (XYLOCAINE) 2 % jelly, APPLY TO LEFT LEG WOUND DAILY AS DIRECTED, Disp: , Rfl: 2  •  linezolid (ZYVOX) 600 mg tablet, Take 600 mg by mouth 2 (two) times a day., Disp: , Rfl:   •  metoprolol tartrate (LOPRESSOR) 25 mg tablet, Take 25 mg by  mouth 2 (two) times a day., Disp: , Rfl:   •  oxybutynin XL (DITROPAN-XL) 5 mg 24 hr tablet, Take 1 tablet (5 mg total) by mouth daily. For bladder symptoms, Disp: 30 tablet, Rfl: 5  •  oxyCODONE-acetaminophen (PERCOCET) 5-325 mg per tablet, Take 1 tablet by mouth every 4 (four) hours as needed for moderate pain., Disp: , Rfl:   •  pantoprazole (PROTONIX) 40 mg EC tablet, Take 40 mg by mouth daily., Disp: , Rfl:   •  sitaGLIPtin-metformin (JANUMET) 50-1,000 mg per tablet, daily. JANUMET  100/1000 MG , Disp: , Rfl:   •  valsartan (DIOVAN) 80 mg tablet, Take 80 mg by mouth daily., Disp: , Rfl:     Allergies: is allergic to bacitracin; ciprofloxacin; and penicillin.     Review of Systems   Skin: Positive for wound.   All other systems reviewed and are negative.    Objective   There were no vitals taken for this visit.    Physical Exam   Constitutional: She is oriented to person, place, and time. She appears well-developed and well-nourished.   HENT:   Head: Normocephalic.   Eyes: Conjunctivae are normal. Pupils are equal, round, and reactive to light.   Neck: Neck supple.   Cardiovascular: Normal rate and intact distal pulses.    Pulmonary/Chest: Effort normal.   Abdominal: Soft.   Musculoskeletal: Normal range of motion.   Neurological: She is alert and oriented to person, place, and time.   Skin: Skin is warm.   Psychiatric: She has a normal mood and affect.     Left anterior leg wound, before debridement    Left anterior leg wound, post debridement.  Essentially healed       Assessment/Plan    Diagnosis Plan   1. Laceration of left lower extremity, subsequent encounter       Problem List Items Addressed This Visit     Laceration of left lower extremity - Primary      Continue antibiotic ointment for now (to keep soft), with Band-Aid and return on as needed basis    No Follow-up on file.     ALEXANDRIA Beltrán MD

## 2019-01-09 ENCOUNTER — APPOINTMENT (EMERGENCY)
Dept: RADIOLOGY | Facility: HOSPITAL | Age: 84
End: 2019-01-09
Attending: EMERGENCY MEDICINE
Payer: MEDICARE

## 2019-01-09 ENCOUNTER — HOSPITAL ENCOUNTER (EMERGENCY)
Facility: HOSPITAL | Age: 84
Discharge: HOME | End: 2019-01-09
Attending: EMERGENCY MEDICINE | Admitting: EMERGENCY MEDICINE
Payer: MEDICARE

## 2019-01-09 VITALS
DIASTOLIC BLOOD PRESSURE: 83 MMHG | SYSTOLIC BLOOD PRESSURE: 173 MMHG | HEART RATE: 66 BPM | OXYGEN SATURATION: 96 % | WEIGHT: 133 LBS | BODY MASS INDEX: 24.48 KG/M2 | TEMPERATURE: 98.1 F | HEIGHT: 62 IN | RESPIRATION RATE: 16 BRPM
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
## 2019-01-09 DIAGNOSIS — S09.90XA HEAD INJURY, INITIAL ENCOUNTER: Primary | ICD-10-CM

## 2019-01-09 PROCEDURE — 70450 CT HEAD/BRAIN W/O DYE: CPT

## 2019-01-09 PROCEDURE — 99284 EMERGENCY DEPT VISIT MOD MDM: CPT | Mod: 25
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
## 2019-01-09 NOTE — ED ATTESTATION NOTE
The patient was evaluated and managed by the physician assistant / nurse practitioner.     Trevor Hall MD  01/09/19 9850

## 2019-01-09 NOTE — DISCHARGE INSTRUCTIONS
Your CT scan and exam appear normal today  Return to ER if you develop severe headache, repetitive vomiting, confusion, lethargy or any other concerning symptoms  Have a happy 89th birthday!!!

## 2019-01-10 ENCOUNTER — APPOINTMENT (OUTPATIENT)
Dept: LAB | Facility: HOSPITAL | Age: 84
End: 2019-01-10
Attending: NURSE PRACTITIONER
Payer: MEDICARE

## 2019-01-10 ENCOUNTER — TRANSCRIBE ORDERS (OUTPATIENT)
Dept: REGISTRATION | Facility: HOSPITAL | Age: 84
End: 2019-01-10

## 2019-01-10 ENCOUNTER — HOSPITAL ENCOUNTER (OUTPATIENT)
Dept: RADIOLOGY | Facility: HOSPITAL | Age: 84
Discharge: HOME | End: 2019-01-10
Attending: NURSE PRACTITIONER
Payer: MEDICARE

## 2019-01-10 DIAGNOSIS — R06.02 SHORTNESS OF BREATH: ICD-10-CM

## 2019-01-10 DIAGNOSIS — R42 DIZZINESS AND GIDDINESS: Primary | ICD-10-CM

## 2019-01-10 DIAGNOSIS — R42 DIZZINESS AND GIDDINESS: ICD-10-CM

## 2019-01-10 DIAGNOSIS — R73.9 HYPERGLYCEMIA: Primary | ICD-10-CM

## 2019-01-10 LAB
ALBUMIN SERPL-MCNC: 3.9 G/DL (ref 3.4–5)
ALP SERPL-CCNC: 122 IU/L (ref 35–126)
ALT SERPL-CCNC: 19 IU/L (ref 11–54)
ANION GAP SERPL CALC-SCNC: 8 MEQ/L (ref 3–15)
AST SERPL-CCNC: 19 IU/L (ref 15–41)
BASOPHILS # BLD: 0.02 K/UL (ref 0.01–0.1)
BASOPHILS NFR BLD: 0.3 %
BILIRUB SERPL-MCNC: 0.6 MG/DL (ref 0.3–1.2)
BUN SERPL-MCNC: 19 MG/DL (ref 8–20)
CALCIUM SERPL-MCNC: 9 MG/DL (ref 8.9–10.3)
CHLORIDE SERPL-SCNC: 97 MEQ/L (ref 98–109)
CO2 SERPL-SCNC: 27 MEQ/L (ref 22–32)
CREAT SERPL-MCNC: 0.8 MG/DL
DIFFERENTIAL METHOD BLD: NORMAL
EOSINOPHIL # BLD: 0.11 K/UL (ref 0.04–0.36)
EOSINOPHIL NFR BLD: 1.5 %
ERYTHROCYTE [DISTWIDTH] IN BLOOD BY AUTOMATED COUNT: 12.3 % (ref 11.7–14.4)
EST. AVERAGE GLUCOSE BLD GHB EST-MCNC: 315 MG/DL
GFR SERPL CREATININE-BSD FRML MDRD: >60 ML/MIN/1.73M*2
GLUCOSE SERPL-MCNC: 505 MG/DL (ref 70–99)
HBA1C MFR BLD HPLC: 12.6 %
HCT VFR BLDCO AUTO: 40.5 %
HGB BLD-MCNC: 13.5 G/DL
IMM GRANULOCYTES # BLD AUTO: 0.07 K/UL (ref 0–0.08)
IMM GRANULOCYTES NFR BLD AUTO: 0.9 %
LYMPHOCYTES # BLD: 1.6 K/UL (ref 1.2–3.5)
LYMPHOCYTES NFR BLD: 21.5 %
MAGNESIUM SERPL-MCNC: 2 MG/DL (ref 1.8–2.5)
MCH RBC QN AUTO: 30.2 PG (ref 28–33.2)
MCHC RBC AUTO-ENTMCNC: 33.3 G/DL (ref 32.2–35.5)
MCV RBC AUTO: 90.6 FL (ref 83–98)
MONOCYTES # BLD: 0.4 K/UL (ref 0.28–0.8)
MONOCYTES NFR BLD: 5.4 %
NEUTROPHILS # BLD: 5.24 K/UL (ref 1.7–7)
NEUTS SEG NFR BLD: 70.4 %
NRBC BLD-RTO: 0 %
PDW BLD AUTO: 11.5 FL (ref 9.4–12.3)
PLATELET # BLD AUTO: 189 K/UL
POTASSIUM SERPL-SCNC: 4.4 MEQ/L (ref 3.6–5.1)
PROT SERPL-MCNC: 6.8 G/DL (ref 6–8.2)
RBC # BLD AUTO: 4.47 M/UL (ref 3.93–5.22)
SODIUM SERPL-SCNC: 132 MEQ/L (ref 136–144)
TSH SERPL DL<=0.05 MIU/L-ACNC: 2.44 MIU/L (ref 0.34–5.6)
WBC # BLD AUTO: 7.44 K/UL

## 2019-01-10 PROCEDURE — 83036 HEMOGLOBIN GLYCOSYLATED A1C: CPT

## 2019-01-10 PROCEDURE — 84443 ASSAY THYROID STIM HORMONE: CPT | Mod: GA

## 2019-01-10 PROCEDURE — 80053 COMPREHEN METABOLIC PANEL: CPT

## 2019-01-10 PROCEDURE — 71046 X-RAY EXAM CHEST 2 VIEWS: CPT

## 2019-01-10 PROCEDURE — 36415 COLL VENOUS BLD VENIPUNCTURE: CPT

## 2019-01-10 PROCEDURE — 85025 COMPLETE CBC W/AUTO DIFF WBC: CPT

## 2019-01-10 PROCEDURE — 83735 ASSAY OF MAGNESIUM: CPT

## 2019-04-10 ENCOUNTER — TRANSCRIBE ORDERS (OUTPATIENT)
Dept: REGISTRATION | Facility: HOSPITAL | Age: 84
End: 2019-04-10

## 2019-04-10 ENCOUNTER — APPOINTMENT (OUTPATIENT)
Dept: LAB | Facility: HOSPITAL | Age: 84
End: 2019-04-10
Attending: NURSE PRACTITIONER
Payer: MEDICARE

## 2019-04-10 DIAGNOSIS — E11.9 TYPE 2 DIABETES MELLITUS WITHOUT COMPLICATIONS (CMS/HCC): Primary | ICD-10-CM

## 2019-04-10 DIAGNOSIS — E78.2 MIXED HYPERLIPIDEMIA: ICD-10-CM

## 2019-04-10 DIAGNOSIS — E11.9 TYPE 2 DIABETES MELLITUS WITHOUT COMPLICATIONS (CMS/HCC): ICD-10-CM

## 2019-04-10 LAB
ALBUMIN SERPL-MCNC: 3.7 G/DL (ref 3.4–5)
ALP SERPL-CCNC: 140 IU/L (ref 35–126)
ALT SERPL-CCNC: 23 IU/L (ref 11–54)
ANION GAP SERPL CALC-SCNC: 9 MEQ/L (ref 3–15)
AST SERPL-CCNC: 26 IU/L (ref 15–41)
BILIRUB SERPL-MCNC: 0.6 MG/DL (ref 0.3–1.2)
BUN SERPL-MCNC: 12 MG/DL (ref 8–20)
CALCIUM SERPL-MCNC: 9.3 MG/DL (ref 8.9–10.3)
CHLORIDE SERPL-SCNC: 103 MEQ/L (ref 98–109)
CO2 SERPL-SCNC: 28 MEQ/L (ref 22–32)
CREAT SERPL-MCNC: 1 MG/DL
EST. AVERAGE GLUCOSE BLD GHB EST-MCNC: 217 MG/DL
GFR SERPL CREATININE-BSD FRML MDRD: 52.1 ML/MIN/1.73M*2
GLUCOSE SERPL-MCNC: 87 MG/DL (ref 70–99)
HBA1C MFR BLD HPLC: 9.2 %
POTASSIUM SERPL-SCNC: 4.5 MEQ/L (ref 3.6–5.1)
PROT SERPL-MCNC: 6.7 G/DL (ref 6–8.2)
SODIUM SERPL-SCNC: 140 MEQ/L (ref 136–144)

## 2019-04-10 PROCEDURE — 36415 COLL VENOUS BLD VENIPUNCTURE: CPT

## 2019-04-10 PROCEDURE — 80053 COMPREHEN METABOLIC PANEL: CPT

## 2019-04-10 PROCEDURE — 83036 HEMOGLOBIN GLYCOSYLATED A1C: CPT

## 2019-08-01 ENCOUNTER — TRANSCRIBE ORDERS (OUTPATIENT)
Dept: SCHEDULING | Age: 84
End: 2019-08-01

## 2019-08-01 DIAGNOSIS — H53.40 VISUAL FIELD DEFECTS: Primary | ICD-10-CM

## 2019-08-08 ENCOUNTER — TRANSCRIBE ORDERS (OUTPATIENT)
Dept: REGISTRATION | Facility: HOSPITAL | Age: 84
End: 2019-08-08

## 2019-08-08 ENCOUNTER — HOSPITAL ENCOUNTER (OUTPATIENT)
Dept: RADIOLOGY | Facility: HOSPITAL | Age: 84
Discharge: HOME | End: 2019-08-08
Attending: FAMILY MEDICINE
Payer: MEDICARE

## 2019-08-08 ENCOUNTER — APPOINTMENT (OUTPATIENT)
Dept: LAB | Facility: HOSPITAL | Age: 84
End: 2019-08-08
Attending: NURSE PRACTITIONER
Payer: MEDICARE

## 2019-08-08 DIAGNOSIS — R42 DIZZINESS AND GIDDINESS: ICD-10-CM

## 2019-08-08 DIAGNOSIS — H53.40 VISUAL FIELD DEFECTS: Primary | ICD-10-CM

## 2019-08-08 DIAGNOSIS — E11.9 TYPE 2 DIABETES MELLITUS WITHOUT COMPLICATIONS (CMS/HCC): ICD-10-CM

## 2019-08-08 DIAGNOSIS — H53.40 VISUAL FIELD DEFECTS: ICD-10-CM

## 2019-08-08 DIAGNOSIS — E78.2 MIXED HYPERLIPIDEMIA: ICD-10-CM

## 2019-08-08 LAB
ALBUMIN SERPL-MCNC: 3.7 G/DL (ref 3.4–5)
ALP SERPL-CCNC: 140 IU/L (ref 35–126)
ALT SERPL-CCNC: 18 IU/L (ref 11–54)
ANION GAP SERPL CALC-SCNC: 10 MEQ/L (ref 3–15)
AST SERPL-CCNC: 22 IU/L (ref 15–41)
BASOPHILS # BLD: 0.04 K/UL (ref 0.01–0.1)
BASOPHILS NFR BLD: 0.5 %
BILIRUB SERPL-MCNC: 0.5 MG/DL (ref 0.3–1.2)
BUN SERPL-MCNC: 15 MG/DL (ref 8–20)
CALCIUM SERPL-MCNC: 9.2 MG/DL (ref 8.9–10.3)
CHLORIDE SERPL-SCNC: 102 MEQ/L (ref 98–109)
CHOLEST SERPL-MCNC: 145 MG/DL
CO2 SERPL-SCNC: 28 MEQ/L (ref 22–32)
CREAT SERPL-MCNC: 1.1 MG/DL
CREAT UR-MCNC: 139.7 MG/DL
DIFFERENTIAL METHOD BLD: NORMAL
EOSINOPHIL # BLD: 0.19 K/UL (ref 0.04–0.36)
EOSINOPHIL NFR BLD: 2.5 %
ERYTHROCYTE [DISTWIDTH] IN BLOOD BY AUTOMATED COUNT: 12.7 % (ref 11.7–14.4)
EST. AVERAGE GLUCOSE BLD GHB EST-MCNC: 171 MG/DL
GFR SERPL CREATININE-BSD FRML MDRD: 46.7 ML/MIN/1.73M*2
GLUCOSE SERPL-MCNC: 190 MG/DL (ref 70–99)
HBA1C MFR BLD HPLC: 7.6 %
HCT VFR BLDCO AUTO: 38.5 %
HDLC SERPL-MCNC: 46 MG/DL
HDLC SERPL: 3.2 {RATIO}
HGB BLD-MCNC: 12.6 G/DL
IMM GRANULOCYTES # BLD AUTO: 0.07 K/UL (ref 0–0.08)
IMM GRANULOCYTES NFR BLD AUTO: 0.9 %
LDLC SERPL CALC-MCNC: 75 MG/DL
LYMPHOCYTES # BLD: 1.68 K/UL (ref 1.2–3.5)
LYMPHOCYTES NFR BLD: 21.9 %
MCH RBC QN AUTO: 29.1 PG (ref 28–33.2)
MCHC RBC AUTO-ENTMCNC: 32.7 G/DL (ref 32.2–35.5)
MCV RBC AUTO: 88.9 FL (ref 83–98)
MONOCYTES # BLD: 0.53 K/UL (ref 0.28–0.8)
MONOCYTES NFR BLD: 6.9 %
NEUTROPHILS # BLD: 5.17 K/UL (ref 1.7–7)
NEUTS SEG NFR BLD: 67.3 %
NONHDLC SERPL-MCNC: 99 MG/DL
NRBC BLD-RTO: 0 %
PDW BLD AUTO: 10.7 FL (ref 9.4–12.3)
PLATELET # BLD AUTO: 252 K/UL
POTASSIUM SERPL-SCNC: 4.9 MEQ/L (ref 3.6–5.1)
PROT SERPL-MCNC: 6.9 G/DL (ref 6–8.2)
RBC # BLD AUTO: 4.33 M/UL (ref 3.93–5.22)
SODIUM SERPL-SCNC: 140 MEQ/L (ref 136–144)
TRIGL SERPL-MCNC: 120 MG/DL (ref 30–149)
WBC # BLD AUTO: 7.68 K/UL

## 2019-08-08 PROCEDURE — 82570 ASSAY OF URINE CREATININE: CPT

## 2019-08-08 PROCEDURE — 80053 COMPREHEN METABOLIC PANEL: CPT

## 2019-08-08 PROCEDURE — 70450 CT HEAD/BRAIN W/O DYE: CPT

## 2019-08-08 PROCEDURE — 85025 COMPLETE CBC W/AUTO DIFF WBC: CPT

## 2019-08-08 PROCEDURE — 83036 HEMOGLOBIN GLYCOSYLATED A1C: CPT

## 2019-08-08 PROCEDURE — 36415 COLL VENOUS BLD VENIPUNCTURE: CPT

## 2019-08-08 PROCEDURE — 80061 LIPID PANEL: CPT

## 2019-08-30 ENCOUNTER — OFFICE VISIT (OUTPATIENT)
Dept: UROGYNECOLOGY | Facility: CLINIC | Age: 84
End: 2019-08-30
Payer: MEDICARE

## 2019-08-30 VITALS
DIASTOLIC BLOOD PRESSURE: 80 MMHG | WEIGHT: 139.8 LBS | BODY MASS INDEX: 25.73 KG/M2 | SYSTOLIC BLOOD PRESSURE: 130 MMHG | HEIGHT: 62 IN

## 2019-08-30 DIAGNOSIS — R10.32 LLQ PAIN: Primary | ICD-10-CM

## 2019-08-30 DIAGNOSIS — N39.41 URGE INCONTINENCE: ICD-10-CM

## 2019-08-30 PROCEDURE — 87086 URINE CULTURE/COLONY COUNT: CPT | Performed by: OBSTETRICS & GYNECOLOGY

## 2019-08-30 PROCEDURE — 99214 OFFICE O/P EST MOD 30 MIN: CPT | Performed by: OBSTETRICS & GYNECOLOGY

## 2019-08-30 RX ORDER — ATORVASTATIN CALCIUM 40 MG/1
40 TABLET, FILM COATED ORAL
Refills: 2 | COMMUNITY
Start: 2019-06-04

## 2019-08-30 RX ORDER — AMLODIPINE BESYLATE 2.5 MG/1
2.5 TABLET ORAL
Refills: 1 | COMMUNITY
Start: 2019-06-13

## 2019-08-30 ASSESSMENT — ENCOUNTER SYMPTOMS
FLANK PAIN: 0
PALPITATIONS: 0
COUGH: 0
BRUISES/BLEEDS EASILY: 0
ADENOPATHY: 0
NAUSEA: 0
CONSTIPATION: 0
UNEXPECTED WEIGHT CHANGE: 0
ABDOMINAL DISTENTION: 1
FEVER: 0
RECTAL PAIN: 0
POLYPHAGIA: 0
FREQUENCY: 0
CHILLS: 0
SHORTNESS OF BREATH: 0
NUMBNESS: 0
VOMITING: 0
NERVOUS/ANXIOUS: 0
ANAL BLEEDING: 0
ABDOMINAL PAIN: 1
DYSURIA: 0
DYSPHORIC MOOD: 0
BLOOD IN STOOL: 0

## 2019-08-30 NOTE — PROGRESS NOTES
"Visit Date: 8/30/2019   Shahrzad Feng is 89 y.o. female who is here for a re-evaluation of LLQ pain,  ? Persistent UTI.  She saw dr Gonzalez two weeks ago.  She was treated with macrobid x 7 days but did not improve.  She then took three more days of antibiotics. She denies dysuria. She has LLQ pain, cramping and bloating. Had a prior hysterectomy.  I reviewed her recent labs and CT scan from 2017      The following have been reviewed and updated as appropriate in this visit:     I have reviewed and updated her list of medications and allergies. I have reviewed the past medical history, including interval changes since her last visit, as well as her family and social history as it was pertinent to her current conditions  /80   Ht 1.575 m (5' 2\")   Wt 63.4 kg (139 lb 12.8 oz)   BMI 25.57 kg/m²     Review of Systems   Constitutional: Negative for chills, fever and unexpected weight change.   Respiratory: Negative for cough and shortness of breath.    Cardiovascular: Negative for chest pain, palpitations and leg swelling.   Gastrointestinal: Positive for abdominal distention and abdominal pain. Negative for anal bleeding, blood in stool, constipation, nausea, rectal pain and vomiting.   Endocrine: Negative for polyphagia.   Genitourinary: Positive for urgency. Negative for dysuria, enuresis, flank pain, frequency, pelvic pain, vaginal bleeding, vaginal discharge and vaginal pain.        Has urge leakage   Skin: Negative for rash.   Neurological: Negative for numbness.   Hematological: Negative for adenopathy. Does not bruise/bleed easily.   Psychiatric/Behavioral: Negative for dysphoric mood. The patient is not nervous/anxious.      Physical Exam   Constitutional: She is oriented to person, place, and time. She appears well-developed and well-nourished.   Genitourinary: Pelvic exam was performed with patient in lithotomy exam position.     External female genitalia normal.   Urethral meatus normal.   Urethra " normal. No stress urinary incontinence.  Normal bladder. No tenderness or bleeding in the vagina. There is no cystocele or rectocele present. The cervix is absent.   Uterus is absent.   Adnexa normal.   Right adnexum non-palpable. Right adnexum does not display mass and does not display tenderness.   Left adnexum non-palpable. Left adnexum does not display mass and does not display tenderness.   Rectal exam shows no rectal prolapse and no external hemorrhoid.   Neck: No JVD present. No thyromegaly present.   Cardiovascular: Normal pulses.    No  JVD  No peripheral edema  Peripheral vascular normal   Pulmonary/Chest: Effort normal. No tachypnea. No respiratory distress.   Abdominal: Soft. Normal appearance. She exhibits no distension, no ascites and no mass. There is no hepatosplenomegaly. There is no tenderness. There is no rebound, no guarding and no CVA tenderness. No hernia.   Lymphadenopathy:        Right: No inguinal adenopathy present.        Left: No inguinal adenopathy present.   Neurological: She is alert and oriented to person, place, and time. No sensory deficit.   Skin: No bruising and no rash noted.   Psychiatric: Her behavior is normal. Her affect is not inappropriate. Cognition and memory are normal. She is attentive.       PLAN:  LLQ pain  She has chronic LLQ pain, bloating and cramping  She previously had a hysterectomy but still has her ovaries  I do not think that this is a UTI. Her UA is negative  Will check pelvic ultrasound to rule out adnexal mass, but she should f/u with her primary  This is likely not gyn in etiology  She does have a h/o diverticular disease    Her symptoms are not consistent with a UTI  Her UA is negative, but we will send it for culture          Mauricio Goldstein MD

## 2019-08-30 NOTE — ASSESSMENT & PLAN NOTE
She has chronic LLQ pain, bloating and cramping  She previously had a hysterectomy but still has her ovaries  I do not think that this is a UTI. Her UA is negative  Will check pelvic ultrasound to rule out adnexal mass, but she should f/u with her primary  This is likely not gyn in etiology  She does have a h/o diverticular disease

## 2019-09-02 LAB — BACTERIA UR CULT: NORMAL

## 2019-09-03 ENCOUNTER — HOSPITAL ENCOUNTER (OUTPATIENT)
Dept: RADIOLOGY | Facility: HOSPITAL | Age: 84
Discharge: HOME | End: 2019-09-03
Attending: OBSTETRICS & GYNECOLOGY
Payer: MEDICARE

## 2019-09-03 DIAGNOSIS — R10.32 LLQ PAIN: ICD-10-CM

## 2019-09-03 PROCEDURE — 76856 US EXAM PELVIC COMPLETE: CPT

## 2019-09-09 ENCOUNTER — TELEPHONE (OUTPATIENT)
Dept: UROGYNECOLOGY | Facility: CLINIC | Age: 84
End: 2019-09-09

## 2019-09-09 NOTE — TELEPHONE ENCOUNTER
I spoke with her regarding her pelvic ultrasound results which was normal  She has mostly left groin pain, and I explained that this is not gyn in etiology  She can follow up with her primary, to be evaluated for a hernia, etc

## 2019-09-17 ENCOUNTER — TELEPHONE (OUTPATIENT)
Dept: UROGYNECOLOGY | Facility: CLINIC | Age: 84
End: 2019-09-17

## 2019-09-17 NOTE — TELEPHONE ENCOUNTER
Rec'd a call from Elly from Dr. Mao Gonzalez office requesting a copy of patients chart note. They have already rec'd a copy of the pelvic ultrasound.     Fax [phone]  Phone [phone]

## 2019-09-26 ENCOUNTER — APPOINTMENT (OUTPATIENT)
Dept: LAB | Facility: HOSPITAL | Age: 84
End: 2019-09-26
Attending: SURGERY
Payer: MEDICARE

## 2019-09-26 ENCOUNTER — OFFICE VISIT (OUTPATIENT)
Dept: SURGERY | Facility: CLINIC | Age: 84
End: 2019-09-26
Payer: MEDICARE

## 2019-09-26 VITALS — HEIGHT: 62 IN | WEIGHT: 141 LBS | BODY MASS INDEX: 25.95 KG/M2

## 2019-09-26 DIAGNOSIS — R59.0 LYMPHADENOPATHY, INGUINAL: Primary | ICD-10-CM

## 2019-09-26 DIAGNOSIS — R59.0 LYMPHADENOPATHY, INGUINAL: ICD-10-CM

## 2019-09-26 LAB
BUN SERPL-MCNC: 19 MG/DL (ref 8–20)
CREAT SERPL-MCNC: 1 MG/DL
GFR SERPL CREATININE-BSD FRML MDRD: 52.1 ML/MIN/1.73M*2

## 2019-09-26 PROCEDURE — 82565 ASSAY OF CREATININE: CPT

## 2019-09-26 PROCEDURE — 99213 OFFICE O/P EST LOW 20 MIN: CPT | Performed by: SURGERY

## 2019-09-26 PROCEDURE — 36415 COLL VENOUS BLD VENIPUNCTURE: CPT

## 2019-09-26 PROCEDURE — 84520 ASSAY OF UREA NITROGEN: CPT

## 2019-09-26 RX ORDER — CLOPIDOGREL BISULFATE 75 MG/1
75 TABLET ORAL DAILY
COMMUNITY

## 2019-09-26 RX ORDER — INSULIN LISPRO 100 [IU]/ML
INJECTION, SOLUTION INTRAVENOUS; SUBCUTANEOUS AS NEEDED
COMMUNITY

## 2019-09-26 RX ORDER — ASPIRIN 81 MG/1
81 TABLET ORAL DAILY
COMMUNITY

## 2019-09-26 RX ORDER — INSULIN GLARGINE 100 [IU]/ML
25 INJECTION, SOLUTION SUBCUTANEOUS NIGHTLY
COMMUNITY

## 2019-09-26 ASSESSMENT — ENCOUNTER SYMPTOMS
COLOR CHANGE: 0
CHILLS: 0
DYSURIA: 0
WOUND: 0
ABDOMINAL PAIN: 0
FEVER: 0
APPETITE CHANGE: 0
DIAPHORESIS: 0

## 2019-09-26 NOTE — PROGRESS NOTES
Surgery Office Note       Patient: Shahrzad Feng  MRN: [id number]  1/12/1928    Visit Date: 9/26/2019  Encounter Provider: Ramón White  Referring Provider: Mao Gonzalez MD    Reason for visit:   Chief Complaint   Patient presents with   • Consult   • Hernia      HPI   Shahrzad Feng is a 89 y.o. female who presents to the office with left inguinal discomfort. Dot does not palpate any masses in the inguinal region or hernias.  This left inguinal discomfort has been present for several months.  11 months ago Mirna had an infection in the left lower leg after falling, which did not resolve for 4 months.  Currently she has no infection in the left leg and no drainage.  She has no skin lesions that she is aware of. Mirna has no fever or chills, and no cough.      Past Medical History:   Diagnosis Date   • Breast cancer (CMS/HCC) ([facility])    • DUB (dysfunctional uterine bleeding)    • GERD (gastroesophageal reflux disease)    • Glaucoma    • Macular degeneration    • MI (myocardial infarction) (CMS/HCC) ([facility])     6/2010   • Type 2 diabetes mellitus (CMS/HCC) ([facility])      Past Surgical History:   Procedure Laterality Date   • APPENDECTOMY     • BREAST LUMPECTOMY Left 2015   • CORONARY ANGIOPLASTY WITH STENT PLACEMENT     • HYSTERECTOMY      subtotal     Social History     Social History Narrative   • No narrative on file     Family History   Problem Relation Age of Onset   • Colon cancer Biological Mother      Bacitracin; Ciprofloxacin; and Penicillin  Current Outpatient Prescriptions   Medication Sig Dispense Refill   • amLODIPine (NORVASC) 2.5 mg tablet Take 2.5 mg by mouth once daily.  1   • aspirin 81 mg enteric coated tablet Take 81 mg by mouth daily.     • atorvastatin (LIPITOR) 40 mg tablet Take 40 mg by mouth once daily.  2   • clopidogrel (PLAVIX) 75 mg tablet Take 75 mg by mouth daily.     • gabapentin (NEURONTIN) 100 mg capsule TAKE 1 CAPSULE ORALLY 3 TIMES A DAY AS NEEDED  1   • insulin glargine  (LANTUS SOLOSTAR) 100 unit/mL (3 mL) subcutaneous pen Inject under the skin nightly. 25 units at night     • insulin lispro (HumaLOG) 100 unit/mL subcutaneous pen Inject under the skin as needed. Sliding scale during the day     • lidocaine (XYLOCAINE) 2 % jelly APPLY TO LEFT LEG WOUND DAILY AS DIRECTED  2   • metoprolol tartrate (LOPRESSOR) 25 mg tablet Take 25 mg by mouth 2 (two) times a day.     • atorvastatin (LIPITOR) 10 mg tablet 20 mg.     • cyclobenzaprine (FLEXERIL) 10 mg tablet Take 1 tablet (10 mg total) by mouth 3 (three) times a day as needed for muscle spasms. No driving or operating heavy machinery if taking. (Patient not taking: Reported on 9/26/2019 ) 15 tablet 0   • dapagliflozin (FARXIGA) 10 mg tablet 5 mg daily.      • linezolid (ZYVOX) 600 mg tablet Take 600 mg by mouth 2 (two) times a day.     • oxybutynin XL (DITROPAN-XL) 5 mg 24 hr tablet Take 1 tablet (5 mg total) by mouth daily. For bladder symptoms (Patient not taking: Reported on 8/30/2019 ) 30 tablet 5   • oxyCODONE-acetaminophen (PERCOCET) 5-325 mg per tablet Take 1 tablet by mouth every 4 (four) hours as needed for moderate pain.     • pantoprazole (PROTONIX) 40 mg EC tablet Take 40 mg by mouth daily.     • sitaGLIPtin-metformin (JANUMET) 50-1,000 mg per tablet daily. JANUMET  100/1000 MG      • valsartan (DIOVAN) 80 mg tablet Take 80 mg by mouth daily.       No current facility-administered medications for this visit.        Review of Systems   Constitutional: Negative for appetite change, chills, diaphoresis and fever.   Gastrointestinal: Negative for abdominal pain.   Genitourinary: Negative for dysuria.   Musculoskeletal: Positive for gait problem.   Skin: Negative for color change, rash and wound.     Objective   There were no vitals filed for this visit.    Physical Exam   Constitutional: She is oriented to person, place, and time. She appears well-developed and well-nourished. No distress.   Abdominal: She exhibits no mass.  There is no tenderness. There is no guarding. No hernia.   Musculoskeletal: She exhibits no edema or tenderness.   Lymphadenopathy:        Right: No inguinal adenopathy present.        Left: No inguinal adenopathy present.   Neurological: She is alert and oriented to person, place, and time.   Skin: Skin is warm and dry. No rash noted. She is not diaphoretic. No erythema.   Vitals reviewed.        Labs  Lab Results   Component Value Date    WBC 7.68 08/08/2019    HGB 12.6 08/08/2019    HCT 38.5 08/08/2019    MCV 88.9 08/08/2019     08/08/2019       Lab Results   Component Value Date    GLUCOSE 190 (H) 08/08/2019    CALCIUM 9.2 08/08/2019     08/08/2019    K 4.9 08/08/2019    CO2 28 08/08/2019     08/08/2019    BUN 15 08/08/2019    CREATININE 1.1 08/08/2019       Lab Results   Component Value Date    ALT 18 08/08/2019    AST 22 08/08/2019    ALKPHOS 140 (H) 08/08/2019    BILITOT 0.5 08/08/2019        Imaging  I have independently reviewed the patient's pertinent imaging for this hospital visit. Significant abnormals are Left inguinal lymphadenopathy with normal architecture internally.   Assessment: Left inguinal lymphadenopathy    Plan: CT of pelvis these lymph nodes do not appear to be pathologic on ultrasound imaging, but rather appeared to be reactive, and this could be reactive from the significant left lower leg infection Dot had 11 months ago. Dot will call me if she has any other symptoms or palpate any masses, etc.     Ramón White MD  9/26/2019

## 2019-09-26 NOTE — LETTER
September 26, 2019     Mao Gonzalez MD  [address]  [facility] [address]    Patient: Shahrzad Feng  YOB: 1928  Date of Visit: 9/26/2019      Dear Dr. Gonzalez:    Thank you for referring Shahrzad Feng to me for evaluation. Below are my notes for this consultation.    If you have questions, please do not hesitate to call me. I look forward to following your patient along with you.         Sincerely,        Ramón White MD        CC: MD Christopher Paige, Ramón LIM MD  9/26/2019  2:11 PM  Signed       Surgery Office Note       Patient: Shahrzad Feng  MRN: [id number]  1/12/1928    Visit Date: 9/26/2019  Encounter Provider: Ramón White  Referring Provider: Mao Gonzalez MD    Reason for visit:   Chief Complaint   Patient presents with   • Consult   • Hernia      HPI   Shahrzad Feng is a 89 y.o. female who presents to the office with left inguinal discomfort. Dot does not palpate any masses in the inguinal region or hernias.  This left inguinal discomfort has been present for several months.  11 months ago Mirna had an infection in the left lower leg after falling, which did not resolve for 4 months.  Currently she has no infection in the left leg and no drainage.  She has no skin lesions that she is aware of. Mirna has no fever or chills, and no cough.      Past Medical History:   Diagnosis Date   • Breast cancer (CMS/HCC) ([facility])    • DUB (dysfunctional uterine bleeding)    • GERD (gastroesophageal reflux disease)    • Glaucoma    • Macular degeneration    • MI (myocardial infarction) (CMS/HCC) ([facility])     6/2010   • Type 2 diabetes mellitus (CMS/HCC) ([facility])      Past Surgical History:   Procedure Laterality Date   • APPENDECTOMY     • BREAST LUMPECTOMY Left 2015   • CORONARY ANGIOPLASTY WITH STENT PLACEMENT     • HYSTERECTOMY      subtotal     Social History     Social History Narrative   • No narrative on file     Family History   Problem Relation Age of  Onset   • Colon cancer Biological Mother      Bacitracin; Ciprofloxacin; and Penicillin  Current Outpatient Prescriptions   Medication Sig Dispense Refill   • amLODIPine (NORVASC) 2.5 mg tablet Take 2.5 mg by mouth once daily.  1   • aspirin 81 mg enteric coated tablet Take 81 mg by mouth daily.     • atorvastatin (LIPITOR) 40 mg tablet Take 40 mg by mouth once daily.  2   • clopidogrel (PLAVIX) 75 mg tablet Take 75 mg by mouth daily.     • gabapentin (NEURONTIN) 100 mg capsule TAKE 1 CAPSULE ORALLY 3 TIMES A DAY AS NEEDED  1   • insulin glargine (LANTUS SOLOSTAR) 100 unit/mL (3 mL) subcutaneous pen Inject under the skin nightly. 25 units at night     • insulin lispro (HumaLOG) 100 unit/mL subcutaneous pen Inject under the skin as needed. Sliding scale during the day     • lidocaine (XYLOCAINE) 2 % jelly APPLY TO LEFT LEG WOUND DAILY AS DIRECTED  2   • metoprolol tartrate (LOPRESSOR) 25 mg tablet Take 25 mg by mouth 2 (two) times a day.     • atorvastatin (LIPITOR) 10 mg tablet 20 mg.     • cyclobenzaprine (FLEXERIL) 10 mg tablet Take 1 tablet (10 mg total) by mouth 3 (three) times a day as needed for muscle spasms. No driving or operating heavy machinery if taking. (Patient not taking: Reported on 9/26/2019 ) 15 tablet 0   • dapagliflozin (FARXIGA) 10 mg tablet 5 mg daily.      • linezolid (ZYVOX) 600 mg tablet Take 600 mg by mouth 2 (two) times a day.     • oxybutynin XL (DITROPAN-XL) 5 mg 24 hr tablet Take 1 tablet (5 mg total) by mouth daily. For bladder symptoms (Patient not taking: Reported on 8/30/2019 ) 30 tablet 5   • oxyCODONE-acetaminophen (PERCOCET) 5-325 mg per tablet Take 1 tablet by mouth every 4 (four) hours as needed for moderate pain.     • pantoprazole (PROTONIX) 40 mg EC tablet Take 40 mg by mouth daily.     • sitaGLIPtin-metformin (JANUMET) 50-1,000 mg per tablet daily. JANUMET  100/1000 MG      • valsartan (DIOVAN) 80 mg tablet Take 80 mg by mouth daily.       No current facility-administered  medications for this visit.        Review of Systems   Constitutional: Negative for appetite change, chills, diaphoresis and fever.   Gastrointestinal: Negative for abdominal pain.   Genitourinary: Negative for dysuria.   Musculoskeletal: Positive for gait problem.   Skin: Negative for color change, rash and wound.     Objective   There were no vitals filed for this visit.    Physical Exam   Constitutional: She is oriented to person, place, and time. She appears well-developed and well-nourished. No distress.   Abdominal: She exhibits no mass. There is no tenderness. There is no guarding. No hernia.   Musculoskeletal: She exhibits no edema or tenderness.   Lymphadenopathy:        Right: No inguinal adenopathy present.        Left: No inguinal adenopathy present.   Neurological: She is alert and oriented to person, place, and time.   Skin: Skin is warm and dry. No rash noted. She is not diaphoretic. No erythema.   Vitals reviewed.        Labs  Lab Results   Component Value Date    WBC 7.68 08/08/2019    HGB 12.6 08/08/2019    HCT 38.5 08/08/2019    MCV 88.9 08/08/2019     08/08/2019       Lab Results   Component Value Date    GLUCOSE 190 (H) 08/08/2019    CALCIUM 9.2 08/08/2019     08/08/2019    K 4.9 08/08/2019    CO2 28 08/08/2019     08/08/2019    BUN 15 08/08/2019    CREATININE 1.1 08/08/2019       Lab Results   Component Value Date    ALT 18 08/08/2019    AST 22 08/08/2019    ALKPHOS 140 (H) 08/08/2019    BILITOT 0.5 08/08/2019        Imaging  I have independently reviewed the patient's pertinent imaging for this hospital visit. Significant abnormals are Left inguinal lymphadenopathy with normal architecture internally.   Assessment: Left inguinal lymphadenopathy    Plan: CT of pelvis these lymph nodes do not appear to be pathologic on ultrasound imaging, but rather appeared to be reactive, and this could be reactive from the significant left lower leg infection Dot had 11 months ago. Dot will  call me if she has any other symptoms or palpate any masses, etc.     Ramón White MD  9/26/2019

## 2019-10-07 ENCOUNTER — HOSPITAL ENCOUNTER (OUTPATIENT)
Dept: RADIOLOGY | Facility: HOSPITAL | Age: 84
Discharge: HOME | End: 2019-10-07
Attending: SURGERY
Payer: MEDICARE

## 2019-10-07 DIAGNOSIS — R59.0 LYMPHADENOPATHY, INGUINAL: ICD-10-CM

## 2019-10-07 PROCEDURE — 72193 CT PELVIS W/DYE: CPT

## 2019-10-07 PROCEDURE — 63600105 HC IODINE BASED CONTRAST: Performed by: SURGERY

## 2019-10-07 RX ADMIN — IOHEXOL 100 ML: 350 INJECTION, SOLUTION INTRAVENOUS at 14:27

## 2019-10-08 ENCOUNTER — TELEPHONE (OUTPATIENT)
Dept: SURGERY | Facility: CLINIC | Age: 84
End: 2019-10-08

## 2019-10-08 NOTE — TELEPHONE ENCOUNTER
----- Message from Ramón White MD sent at 10/8/2019  8:42 AM EDT -----  Please call the patient regarding her CT report: No abnormality seen other than arthritis

## 2020-06-08 ENCOUNTER — APPOINTMENT (EMERGENCY)
Dept: RADIOLOGY | Facility: HOSPITAL | Age: 85
End: 2020-06-08
Attending: EMERGENCY MEDICINE
Payer: MEDICARE

## 2020-06-08 ENCOUNTER — HOSPITAL ENCOUNTER (EMERGENCY)
Facility: HOSPITAL | Age: 85
Discharge: HOME | End: 2020-06-08
Attending: EMERGENCY MEDICINE
Payer: MEDICARE

## 2020-06-08 VITALS
SYSTOLIC BLOOD PRESSURE: 153 MMHG | RESPIRATION RATE: 18 BRPM | TEMPERATURE: 98.4 F | OXYGEN SATURATION: 97 % | HEART RATE: 64 BPM | DIASTOLIC BLOOD PRESSURE: 56 MMHG

## 2020-06-08 DIAGNOSIS — S00.03XA CONTUSION OF SCALP, INITIAL ENCOUNTER: ICD-10-CM

## 2020-06-08 DIAGNOSIS — S09.90XA CLOSED HEAD INJURY, INITIAL ENCOUNTER: Primary | ICD-10-CM

## 2020-06-08 DIAGNOSIS — S50.312A ABRASION OF LEFT ELBOW, INITIAL ENCOUNTER: ICD-10-CM

## 2020-06-08 PROCEDURE — 72125 CT NECK SPINE W/O DYE: CPT

## 2020-06-08 PROCEDURE — 99284 EMERGENCY DEPT VISIT MOD MDM: CPT | Mod: 25

## 2020-06-08 PROCEDURE — 72220 X-RAY EXAM SACRUM TAILBONE: CPT

## 2020-06-08 PROCEDURE — 70450 CT HEAD/BRAIN W/O DYE: CPT

## 2020-06-08 PROCEDURE — 90471 IMMUNIZATION ADMIN: CPT | Performed by: PHYSICIAN ASSISTANT

## 2020-06-08 PROCEDURE — 90715 TDAP VACCINE 7 YRS/> IM: CPT | Performed by: PHYSICIAN ASSISTANT

## 2020-06-08 PROCEDURE — 63600000 HC DRUGS/DETAIL CODE: Performed by: PHYSICIAN ASSISTANT

## 2020-06-08 RX ADMIN — TETANUS TOXOID, REDUCED DIPHTHERIA TOXOID AND ACELLULAR PERTUSSIS VACCINE, ADSORBED 0.5 ML: 5; 2.5; 8; 8; 2.5 SUSPENSION INTRAMUSCULAR at 17:11

## 2020-06-08 ASSESSMENT — ENCOUNTER SYMPTOMS
FACIAL ASYMMETRY: 0
VOMITING: 0
NAUSEA: 0
HEMATURIA: 0
HEADACHES: 0
COUGH: 0
WEAKNESS: 0
PALPITATIONS: 0
EYE REDNESS: 0
SPEECH DIFFICULTY: 0
CHEST TIGHTNESS: 0
ABDOMINAL PAIN: 0
NECK PAIN: 0
FACIAL SWELLING: 0
SEIZURES: 0
WOUND: 1
COLOR CHANGE: 0
DIZZINESS: 0
SHORTNESS OF BREATH: 0
SORE THROAT: 0
DYSURIA: 0
NUMBNESS: 0
LIGHT-HEADEDNESS: 0
BACK PAIN: 0
CHILLS: 0
FEVER: 0
FLANK PAIN: 0
TROUBLE SWALLOWING: 0
DIARRHEA: 0
TREMORS: 0

## 2020-06-08 NOTE — ED PROVIDER NOTES
HPI     Chief Complaint   Patient presents with   • Fall   • Head Injury       89-year-old female complaining of fall and head injury just prior to arrival.  Patient reports she was walking inside from her deck and once she stepped inside she slipped because she was wearing stockings and fell backwards hitting the back of her head on the deck outside.  She denies LOC.  Also reports abrasion to L elbow but no pain to elbow itself. Bleeding controlled. Last tetanus unknown. Reports mild headache over the area where she hit her head.  Otherwise denies neck pain, vision changes, dizziness, nausea, vomiting, abdominal pain, chest pain, shortness of breath, palpitations, open wounds, back pain, gait changes, weakness.  Nothing tried for relief prior to arrival.  Patient reports she is here for a CAT scan because she is on blood thinners.           Patient History     Past Medical History:   Diagnosis Date   • Breast cancer (CMS/[facility])    • DUB (dysfunctional uterine bleeding)    • GERD (gastroesophageal reflux disease)    • Glaucoma    • Macular degeneration    • MI (myocardial infarction) (CMS/[facility])     6/2010   • Type 2 diabetes mellitus (CMS/[facility])        Past Surgical History:   Procedure Laterality Date   • APPENDECTOMY     • BREAST LUMPECTOMY Left 2015   • CORONARY ANGIOPLASTY WITH STENT PLACEMENT     • HYSTERECTOMY      subtotal       Family History   Problem Relation Age of Onset   • Colon cancer Biological Mother        Social History     Tobacco Use   • Smoking status: Former Smoker   • Smokeless tobacco: Never Used   Substance Use Topics   • Alcohol use: No   • Drug use: No       Systems Reviewed from Nursing Triage:          Review of Systems     Review of Systems   Constitutional: Negative for chills and fever.   HENT: Negative for facial swelling, sore throat and trouble swallowing.    Eyes: Negative for redness and visual disturbance.   Respiratory: Negative for cough, chest tightness and shortness of breath.     Cardiovascular: Negative for chest pain and palpitations.   Gastrointestinal: Negative for abdominal pain, diarrhea, nausea and vomiting.   Genitourinary: Negative for dysuria, flank pain and hematuria.   Musculoskeletal: Negative for back pain, gait problem and neck pain.   Skin: Positive for wound. Negative for color change and rash.   Neurological: Negative for dizziness, tremors, seizures, syncope, facial asymmetry, speech difficulty, weakness, light-headedness, numbness and headaches.        Physical Exam     ED Triage Vitals [06/08/20 1617]   Temp Heart Rate Resp BP SpO2   36.8 °C (98.3 °F) 64 16 133/70 97 %      Temp src Heart Rate Source Patient Position BP Location FiO2 (%) (Set)   -- -- -- -- --       Pulse Ox %: 97 % (06/08/20 1626)  Pulse Ox Interpretation: Normal (06/08/20 1626)          Patient Vitals for the past 24 hrs:   BP Temp Pulse Resp SpO2   06/08/20 1617 133/70 36.8 °C (98.3 °F) 64 16 97 %                                          Physical Exam   Constitutional: She is oriented to person, place, and time. She appears well-developed and well-nourished. No distress.   HENT:   Head: Normocephalic. Head is with contusion. Head is without raccoon's eyes, without Partida's sign, without abrasion, without laceration, without right periorbital erythema and without left periorbital erythema.       Right Ear: Tympanic membrane and ear canal normal. No hemotympanum.   Left Ear: Tympanic membrane and ear canal normal. No hemotympanum.   Mouth/Throat: Uvula is midline, oropharynx is clear and moist and mucous membranes are normal.   Eyes: Pupils are equal, round, and reactive to light. Conjunctivae, EOM and lids are normal.   Neck: Trachea normal, normal range of motion and full passive range of motion without pain. Neck supple. No spinous process tenderness and no muscular tenderness present. No neck rigidity. No edema, no erythema and normal range of motion present.   Cardiovascular: Normal rate, regular  rhythm, normal heart sounds and intact distal pulses. Exam reveals no friction rub.   No murmur heard.  Pulmonary/Chest: Effort normal and breath sounds normal. No stridor. No respiratory distress. She has no wheezes. She has no rales.   Abdominal: Soft. Bowel sounds are normal. She exhibits no distension and no mass. There is no tenderness. There is no rigidity, no rebound and no guarding.   Musculoskeletal:        Left shoulder: Normal.        Left elbow: She exhibits laceration (Quarter sized superficial abrasion. bleeding controlled. ). She exhibits normal range of motion, no swelling, no effusion and no deformity. No tenderness found.        Left wrist: Normal.        Cervical back: Normal.        Thoracic back: Normal.        Lumbar back: Normal.        Left hand: She exhibits normal range of motion and normal capillary refill. Normal sensation noted. Normal strength noted.   Neurological: She is alert and oriented to person, place, and time. She has normal strength. No cranial nerve deficit or sensory deficit. Coordination and gait normal.   Skin: Skin is warm and dry. Capillary refill takes less than 2 seconds.   Psychiatric: She has a normal mood and affect.   Nursing note and vitals reviewed.           Procedures    Labs Reviewed - No data to display    X-RAY SACRUM AND COCCYX   Final Result   IMPRESSION:      No evidence for displaced sacrococcygeal fracture.            CT HEAD WITHOUT IV CONTRAST   Final Result   IMPRESSION:      1.  No acute intracranial abnormality.  Chronic lacunar infarcts in the brain.   2.  No acute cervical spine fracture.      CT CERVICAL SPINE WITHOUT IV CONTRAST   Final Result   IMPRESSION:      1.  No acute intracranial abnormality.  Chronic lacunar infarcts in the brain.   2.  No acute cervical spine fracture.                  ED Course & MDM     MDM         ED Course as of Jun 08 1828   Mon Jun 08, 2020   1710 CT's unremarkable. Discussed rice, proper med use, need for f/u  and return precautions, pt verb understanding.      [ET]   1714 Pt now c/o upper buttocks/low back pain from the fall. Will check xray and plan for d/c if unremarkable. NO bowel/bladder incontinence, saddle anesthesia, urinary sx, gait changes. Pt ambulating with steady gait.     [ET]      ED Course User Index  [ET] Vivian Diaz PA C         Clinical Impressions as of Jun 08 1828   Closed head injury, initial encounter   Contusion of scalp, initial encounter   Abrasion of left elbow, initial encounter        Vivian Diaz PA C  06/08/20 1828

## 2020-06-08 NOTE — DISCHARGE INSTRUCTIONS
RETURN TO THE ER IF WORSE  Follow up with primary care doctor as soon as possible  Take tylenol as needed for pain    
IV discontinued, cath removed intact

## 2020-06-08 NOTE — ED ATTESTATION NOTE
The patient was evaluated and managed by the physician assistant / nurse practitioner.     Jefry Herrera DO  06/08/20 1760

## 2020-10-23 ENCOUNTER — TRANSCRIBE ORDERS (OUTPATIENT)
Dept: LAB | Age: 85
End: 2020-10-23

## 2020-10-23 ENCOUNTER — APPOINTMENT (OUTPATIENT)
Dept: LAB | Age: 85
End: 2020-10-23
Attending: NURSE PRACTITIONER
Payer: MEDICARE

## 2020-10-23 DIAGNOSIS — E78.2 MIXED HYPERLIPIDEMIA: ICD-10-CM

## 2020-10-23 DIAGNOSIS — E78.2 MIXED HYPERLIPIDEMIA: Primary | ICD-10-CM

## 2020-10-23 DIAGNOSIS — Z13.0 ENCOUNTER FOR SCREENING FOR DISEASES OF THE BLOOD AND BLOOD-FORMING ORGANS AND CERTAIN DISORDERS INVOLVING THE IMMUNE MECHANISM: ICD-10-CM

## 2020-10-23 DIAGNOSIS — Z13.1 ENCOUNTER FOR SCREENING FOR DIABETES MELLITUS: ICD-10-CM

## 2020-10-23 DIAGNOSIS — Z13.6 ENCOUNTER FOR SCREENING FOR CARDIOVASCULAR DISORDERS: ICD-10-CM

## 2020-10-23 DIAGNOSIS — R73.9 PRE-MEAL BLOOD GLUCOSE BETWEEN 8.0 AND 11.9 MMOL/L: ICD-10-CM

## 2020-10-23 LAB
ALBUMIN SERPL-MCNC: 3.7 G/DL (ref 3.4–5)
ALBUMIN/CREAT UR: 24 UG/MG
ALP SERPL-CCNC: 110 IU/L (ref 35–126)
ALT SERPL-CCNC: 17 IU/L (ref 11–54)
ANION GAP SERPL CALC-SCNC: 9 MEQ/L (ref 3–15)
AST SERPL-CCNC: 16 IU/L (ref 15–41)
BASOPHILS # BLD: 0.03 K/UL (ref 0.01–0.1)
BASOPHILS NFR BLD: 0.4 %
BILIRUB SERPL-MCNC: 0.9 MG/DL (ref 0.3–1.2)
BUN SERPL-MCNC: 16 MG/DL (ref 8–20)
CALCIUM SERPL-MCNC: 9.5 MG/DL (ref 8.9–10.3)
CHLORIDE SERPL-SCNC: 97 MEQ/L (ref 98–109)
CHOLEST SERPL-MCNC: 162 MG/DL
CO2 SERPL-SCNC: 28 MEQ/L (ref 22–32)
CREAT SERPL-MCNC: 1 MG/DL (ref 0.6–1.1)
CREAT UR-MCNC: 54.1 MG/DL
DIFFERENTIAL METHOD BLD: ABNORMAL
EOSINOPHIL # BLD: 0.08 K/UL (ref 0.04–0.36)
EOSINOPHIL NFR BLD: 1.1 %
ERYTHROCYTE [DISTWIDTH] IN BLOOD BY AUTOMATED COUNT: 12.5 % (ref 11.7–14.4)
GFR SERPL CREATININE-BSD FRML MDRD: 52 ML/MIN/1.73M*2
GLUCOSE SERPL-MCNC: 439 MG/DL (ref 70–99)
HCT VFR BLDCO AUTO: 41.8 % (ref 35–45)
HDLC SERPL-MCNC: 54 MG/DL
HDLC SERPL: 3 {RATIO}
HGB BLD-MCNC: 13.9 G/DL (ref 11.8–15.7)
IMM GRANULOCYTES # BLD AUTO: 0.09 K/UL (ref 0–0.08)
IMM GRANULOCYTES NFR BLD AUTO: 1.2 %
LDLC SERPL CALC-MCNC: 81 MG/DL
LYMPHOCYTES # BLD: 1.84 K/UL (ref 1.2–3.5)
LYMPHOCYTES NFR BLD: 24.8 %
MCH RBC QN AUTO: 30.1 PG (ref 28–33.2)
MCHC RBC AUTO-ENTMCNC: 33.3 G/DL (ref 32.2–35.5)
MCV RBC AUTO: 90.5 FL (ref 83–98)
MICROALBUMIN UR-MCNC: 13 MG/L
MONOCYTES # BLD: 0.56 K/UL (ref 0.28–0.8)
MONOCYTES NFR BLD: 7.6 %
NEUTROPHILS # BLD: 4.81 K/UL (ref 1.7–7)
NEUTS SEG NFR BLD: 64.9 %
NONHDLC SERPL-MCNC: 108 MG/DL
NRBC BLD-RTO: 0 %
PDW BLD AUTO: 11.6 FL (ref 9.4–12.3)
PLATELET # BLD AUTO: 217 K/UL (ref 150–369)
POTASSIUM SERPL-SCNC: 5.3 MEQ/L (ref 3.6–5.1)
PROT SERPL-MCNC: 6.3 G/DL (ref 6–8.2)
RBC # BLD AUTO: 4.62 M/UL (ref 3.93–5.22)
SODIUM SERPL-SCNC: 134 MEQ/L (ref 136–144)
TRIGL SERPL-MCNC: 136 MG/DL (ref 30–149)
TSH SERPL DL<=0.05 MIU/L-ACNC: 2.55 MIU/L (ref 0.34–5.6)
WBC # BLD AUTO: 7.41 K/UL (ref 3.8–10.5)

## 2020-10-23 PROCEDURE — 80053 COMPREHEN METABOLIC PANEL: CPT

## 2020-10-23 PROCEDURE — 80061 LIPID PANEL: CPT

## 2020-10-23 PROCEDURE — 36415 COLL VENOUS BLD VENIPUNCTURE: CPT

## 2020-10-23 PROCEDURE — 82570 ASSAY OF URINE CREATININE: CPT

## 2020-10-23 PROCEDURE — 84443 ASSAY THYROID STIM HORMONE: CPT

## 2020-10-23 PROCEDURE — 83036 HEMOGLOBIN GLYCOSYLATED A1C: CPT

## 2020-10-23 PROCEDURE — 85025 COMPLETE CBC W/AUTO DIFF WBC: CPT

## 2020-10-24 LAB
EST. AVERAGE GLUCOSE BLD GHB EST-MCNC: 303 MG/DL
HBA1C MFR BLD HPLC: 12.2 %

## 2021-01-30 DIAGNOSIS — Z23 ENCOUNTER FOR IMMUNIZATION: ICD-10-CM

## 2021-02-03 ENCOUNTER — APPOINTMENT (EMERGENCY)
Dept: RADIOLOGY | Facility: HOSPITAL | Age: 86
End: 2021-02-03
Attending: EMERGENCY MEDICINE
Payer: MEDICARE

## 2021-02-03 ENCOUNTER — HOSPITAL ENCOUNTER (EMERGENCY)
Facility: HOSPITAL | Age: 86
Discharge: HOME | End: 2021-02-03
Attending: EMERGENCY MEDICINE
Payer: MEDICARE

## 2021-02-03 VITALS
WEIGHT: 142.6 LBS | BODY MASS INDEX: 25.27 KG/M2 | OXYGEN SATURATION: 95 % | HEIGHT: 63 IN | TEMPERATURE: 98.6 F | SYSTOLIC BLOOD PRESSURE: 150 MMHG | RESPIRATION RATE: 16 BRPM | DIASTOLIC BLOOD PRESSURE: 70 MMHG
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
## 2021-02-03 DIAGNOSIS — S42.322A CLOSED DISPLACED TRANSVERSE FRACTURE OF SHAFT OF LEFT HUMERUS, INITIAL ENCOUNTER: ICD-10-CM

## 2021-02-03 PROCEDURE — 73030 X-RAY EXAM OF SHOULDER: CPT | Mod: LT

## 2021-02-03 PROCEDURE — 63600000 HC DRUGS/DETAIL CODE: Performed by: PHYSICIAN ASSISTANT

## 2021-02-03 PROCEDURE — 73060 X-RAY EXAM OF HUMERUS: CPT | Mod: LT

## 2021-02-03 PROCEDURE — 99284 EMERGENCY DEPT VISIT MOD MDM: CPT | Mod: 25

## 2021-02-03 RX ORDER — MORPHINE SULFATE 4 MG/ML
4 INJECTION, SOLUTION INTRAMUSCULAR; INTRAVENOUS ONCE
Status: COMPLETED | OUTPATIENT
Start: 2021-02-03 | End: 2021-02-03

## 2021-02-03 RX ORDER — NITROGLYCERIN 0.4 MG/1
0.4 TABLET SUBLINGUAL EVERY 5 MIN PRN
COMMUNITY

## 2021-02-03 RX ORDER — TRAMADOL HYDROCHLORIDE 50 MG/1
50 TABLET ORAL EVERY 6 HOURS PRN
Qty: 12 TABLET | Refills: 0 | Status: SHIPPED | OUTPATIENT
Start: 2021-02-03 | End: 2021-02-13

## 2021-02-03 RX ORDER — ONDANSETRON HYDROCHLORIDE 2 MG/ML
4 INJECTION, SOLUTION INTRAVENOUS ONCE
Status: COMPLETED | OUTPATIENT
Start: 2021-02-03 | End: 2021-02-03

## 2021-02-03 RX ORDER — VALSARTAN 320 MG/1
320 TABLET ORAL
COMMUNITY
Start: 2020-12-10

## 2021-02-03 RX ADMIN — MORPHINE SULFATE 4 MG: 4 INJECTION, SOLUTION INTRAMUSCULAR; INTRAVENOUS at 20:51

## 2021-02-03 RX ADMIN — ONDANSETRON 4 MG: 2 INJECTION INTRAMUSCULAR; INTRAVENOUS at 20:51

## 2021-02-03 ASSESSMENT — ENCOUNTER SYMPTOMS
NAUSEA: 0
HEADACHES: 0
NECK STIFFNESS: 0
NECK PAIN: 0
FEVER: 0
SHORTNESS OF BREATH: 0
VOMITING: 0
LIGHT-HEADEDNESS: 0
NUMBNESS: 0
WEAKNESS: 0
FATIGUE: 0
BACK PAIN: 0
DIZZINESS: 0

## 2021-02-04 NOTE — DISCHARGE INSTRUCTIONS
Follow up with ortho- call the office for appointment  Keep in splint and sling  Return to ED if any concerns

## 2021-02-04 NOTE — ED PROVIDER NOTES
HPI     Chief Complaint   Patient presents with   • Fall   • Shoulder Injury       89-year-old female here for mechanical fall that occurred just prior arrival.  Patient reports was getting up to go the kitchen tripped and fell and landed on her left shoulder.  Reports significant pain.  Was not given anything for pain prior to arrival.  Denies head or neck injury.  Denies any other associated injuries.  Denies any numbness, tingling.  Denies injury to the site in the past.      Trauma  Mechanism of injury: fall     Current symptoms:       Associated symptoms:             Denies back pain, headache, nausea, neck pain and vomiting.   Shoulder Injury  Associated symptoms: no fatigue, no fever, no headaches, no nausea, no rash, no shortness of breath and no vomiting         Patient History     Past Medical History:   Diagnosis Date   • Breast cancer (CMS/[facility])    • DUB (dysfunctional uterine bleeding)    • GERD (gastroesophageal reflux disease)    • Glaucoma    • Macular degeneration    • MI (myocardial infarction) (CMS/[facility])     6/2010   • Type 2 diabetes mellitus (CMS/[facility])        Past Surgical History:   Procedure Laterality Date   • APPENDECTOMY     • BREAST LUMPECTOMY Left 2015   • CORONARY ANGIOPLASTY WITH STENT PLACEMENT     • HYSTERECTOMY      subtotal       Family History   Problem Relation Age of Onset   • Colon cancer Biological Mother        Social History     Tobacco Use   • Smoking status: Former Smoker   • Smokeless tobacco: Never Used   Substance Use Topics   • Alcohol use: No   • Drug use: No       Systems Reviewed from Nursing Triage:          Review of Systems     Review of Systems   Constitutional: Negative for fatigue and fever.   Respiratory: Negative for shortness of breath.    Cardiovascular: Negative for leg swelling.   Gastrointestinal: Negative for nausea and vomiting.   Musculoskeletal: Negative for back pain, neck pain and neck stiffness.   Skin: Negative for rash.   Neurological: Negative for  dizziness, weakness, light-headedness, numbness and headaches.        Physical Exam     ED Vitals    Date/Time Temp Pulse Resp BP SpO2 [facility]   02/03/21 2214 -- -- 16 150/70 95 % KDP   02/03/21 2103 37 °C (98.6 °F) -- -- -- -- KDP   02/03/21 2100 -- -- 16 160/78 97 % KDP   02/03/21 2045 -- -- 15 191/78 94 % KDP          Pulse Ox %: 97 % (02/03/21 2214)  Pulse Ox Interpretation: Normal (02/03/21 2214)                                             Physical Exam  Vitals signs and nursing note reviewed.   Constitutional:       General: She is not in acute distress.     Appearance: Normal appearance. She is normal weight. She is not ill-appearing, toxic-appearing or diaphoretic.   HENT:      Head: Normocephalic and atraumatic.      Nose: Nose normal.      Mouth/Throat:      Mouth: Mucous membranes are moist.   Eyes:      Pupils: Pupils are equal, round, and reactive to light.   Neck:      Musculoskeletal: Normal range of motion. No neck rigidity or muscular tenderness.   Cardiovascular:      Rate and Rhythm: Normal rate.   Pulmonary:      Effort: Pulmonary effort is normal.   Chest:      Chest wall: No tenderness.   Musculoskeletal: Normal range of motion.         General: Swelling, tenderness and signs of injury present. No deformity.      Comments: Tenderness palpation of left shoulder.  There is mild swelling.  Neurovascular intact distally.  Good range of motion to elbow wrist and 5 fingers.  Strong radial pulse.  No obvious deformity.   Lymphadenopathy:      Cervical: No cervical adenopathy.   Skin:     General: Skin is warm.   Neurological:      Mental Status: She is alert and oriented to person, place, and time.              Procedures    Results     None          Imaging Results          X-RAY HUMERUS LEFT (Preliminary result)  Result time 02/03/21 22:11:48    ED Interpretation    fx                             X-RAY SHOULDER LEFT 2+ VIEWS (Preliminary result)  Result time 02/03/21 22:11:45    ED Interpretation    fx                               No orders to display               ED Course & MDM     MDM         ED Course as of Feb 03 2232   Wed Feb 03, 2021 2137 Case discussed with Sade- Gatito will come evaluate patient     [DE]   2223 Ortho splinted patient okay for outpatient follow-up.  Patient's pain is controlled at this time.    [DE]      ED Course User Index  [DE] Mary Gao PA C         Clinical Impressions as of Feb 03 2232   Fall, initial encounter   Closed displaced transverse fracture of shaft of left humerus, initial encounter        Mary Gao PA C  02/03/21 2232

## 2021-02-04 NOTE — CONSULTS
Orthopedic Consult Note    Subjective     Shahrzad Feng is a 89 y.o. female LHD who presents w/ L arm pain after fall from standing. Denies LOC or other injuries. Remembers the event. Uses a cane at baseline in her left hand. Denies N/T.    Pertinent radiology results reviewed..    Medical History:   Past Medical History:   Diagnosis Date   • Breast cancer (CMS/[facility])    • DUB (dysfunctional uterine bleeding)    • GERD (gastroesophageal reflux disease)    • Glaucoma    • Macular degeneration    • MI (myocardial infarction) (CMS/[facility])     6/2010   • Type 2 diabetes mellitus (CMS/[facility])        Surgical History:   Past Surgical History:   Procedure Laterality Date   • APPENDECTOMY     • BREAST LUMPECTOMY Left 2015   • CORONARY ANGIOPLASTY WITH STENT PLACEMENT     • HYSTERECTOMY      subtotal       Social History:   Social History     Social History Narrative   • Not on file       Family History:   Family History   Problem Relation Age of Onset   • Colon cancer Biological Mother        Allergies: Bacitracin, Ciprofloxacin, and Penicillin    No current facility-administered medications for this encounter.         Medication List      START taking these medications    traMADoL 50 mg tablet  Commonly known as: ULTRAM  Take 1 tablet (50 mg total) by mouth every 6 (six) hours as needed for moderate pain for up to 10 days.  Dose: 50 mg        ASK your doctor about these medications    amLODIPine 2.5 mg tablet  Commonly known as: NORVASC  Take 2.5 mg by mouth once daily.  Dose: 2.5 mg     aspirin 81 mg enteric coated tablet  Take 81 mg by mouth daily.  Dose: 81 mg     atorvastatin 40 mg tablet  Commonly known as: LIPITOR  Take 40 mg by mouth once daily.  Dose: 40 mg     clopidogreL 75 mg tablet  Commonly known as: PLAVIX  Take 75 mg by mouth daily.  Dose: 75 mg     gabapentin 100 mg capsule  Commonly known as: NEURONTIN  as needed.     insulin glargine U-100 100 unit/mL (3 mL) pen  Commonly known as: LANTUS  SOLOSTAR/BASAGLAR  Inject 25 Units under the skin nightly. 25 units at night  Dose: 25 Units     insulin lispro U-100 100 unit/mL pen  Commonly known as: HumaLOG  Inject under the skin as needed. Sliding scale during the day     lidocaine 2 % jelly  Commonly known as: XYLOCAINE  APPLY TO LEFT LEG WOUND DAILY AS DIRECTED     metoprolol tartrate 25 mg tablet  Commonly known as: LOPRESSOR  Take 25 mg by mouth 2 (two) times a day.  Dose: 25 mg     nitroglycerin 0.4 mg SL tablet  Commonly known as: NITROSTAT  Place 0.4 mg under the tongue every 5 (five) minutes as needed for chest pain.  Dose: 0.4 mg     PRESERVISION AREDS-2 ORAL  Take by mouth daily.     valsartan 320 mg tablet  Commonly known as: DIOVAN  Take 320 mg by mouth once daily.  Dose: 320 mg          Review of Systems  Pertinent items are noted in HPI.    Objective     Imaging  L shoulder: left proximal humerus fracture which extends into the shaft    Physical Exam  Gen: NAD, alert    MSK LUE:    No open wounds  No gross deformity  SILT m/r/u  +ain/pin/ulnar motor  2+ radial pulse    Assessment   89 y.o. female w/ L proximal humerus fracture    Plan     Coaptation splint and sling applied  NWB LUE  No acute orthopaedic intervention  F/u [facility] w/i 1 week of discharge

## 2021-02-06 NOTE — ED ATTESTATION NOTE
The patient was evaluated and managed by the physician assistant supervised by me.  I partcipated in the care of this patient.       Kenia Sung DO  02/06/21 0614

## 2021-03-08 ENCOUNTER — IMMUNIZATION (OUTPATIENT)
Dept: IMMUNIZATION | Facility: CLINIC | Age: 86
End: 2021-03-08

## 2021-03-29 ENCOUNTER — OFFICE VISIT (OUTPATIENT)
Dept: WOUND CARE | Facility: HOSPITAL | Age: 86
End: 2021-03-29
Attending: PLASTIC SURGERY
Payer: MEDICARE

## 2021-03-29 VITALS — TEMPERATURE: 98.3 F | HEART RATE: 80 BPM | RESPIRATION RATE: 18 BRPM

## 2021-03-29 DIAGNOSIS — S81.801A WOUND OF RIGHT LOWER EXTREMITY, INITIAL ENCOUNTER: Primary | ICD-10-CM

## 2021-03-29 PROBLEM — S81.812A LACERATION OF LEFT LOWER EXTREMITY: Status: RESOLVED | Noted: 2018-11-12 | Resolved: 2021-03-29

## 2021-03-29 PROCEDURE — 27200105 HC AQUA CELL 4X4

## 2021-03-29 PROCEDURE — G0463 HOSPITAL OUTPT CLINIC VISIT: HCPCS

## 2021-03-29 PROCEDURE — G0463 HOSPITAL OUTPT CLINIC VISIT: HCPCS | Performed by: PLASTIC SURGERY

## 2021-03-29 PROCEDURE — 99212 OFFICE O/P EST SF 10 MIN: CPT | Performed by: PLASTIC SURGERY

## 2021-03-29 RX ORDER — DOXYCYCLINE 100 MG/1
100 CAPSULE ORAL 2 TIMES DAILY
Qty: 20 CAPSULE | Refills: 0 | Status: SHIPPED | OUTPATIENT
Start: 2021-03-29 | End: 2021-04-08

## 2021-03-29 ASSESSMENT — ENCOUNTER SYMPTOMS
FATIGUE: 1
WEAKNESS: 1
WOUND: 1

## 2021-03-29 NOTE — PROGRESS NOTES
Patient ID: Shahrzad Feng                              : 1928  MRN: [id number]                                            Visit Date: 3/29/2021  Encounter Provider: ALEXANDRIA Beltrán  Referring Provider: No ref. provider found    Subjective      HPI  Shahrzad Feng is a 89 y.o. old female with a chief compliant of Traumatic Wound   presenting today for: Treatment of traumatic wound right lower extremity.  She sustained a broken arm 1 month ago and has been in a recliner.  She probably injured her leg when it slid off the recliner.  It has been treated with Silvadene dressing changes    The following have been reviewed and updated as appropriate in this visit:       Past Medical History:  has a past medical history of Breast cancer (CMS/[facility]), DUB (dysfunctional uterine bleeding), GERD (gastroesophageal reflux disease), Glaucoma, Macular degeneration, MI (myocardial infarction) (CMS/[facility]), and Type 2 diabetes mellitus (CMS/[facility]).  Past Surgical History:  has a past surgical history that includes Breast lumpectomy (Left, 2015); Appendectomy; Coronary angioplasty with stent; and Hysterectomy.  Social History:  reports that she has quit smoking. She has never used smokeless tobacco. She reports that she does not drink alcohol or use drugs.  Family History: family history includes Colon cancer in her biological mother.  Medications:   Current Outpatient Medications:   •  amLODIPine (NORVASC) 2.5 mg tablet, Take 2.5 mg by mouth once daily., Disp: , Rfl: 1  •  aspirin 81 mg enteric coated tablet, Take 81 mg by mouth daily., Disp: , Rfl:   •  atorvastatin (LIPITOR) 40 mg tablet, Take 40 mg by mouth once daily., Disp: , Rfl: 2  •  gabapentin (NEURONTIN) 100 mg capsule, as needed. , Disp: , Rfl: 1  •  insulin glargine (LANTUS SOLOSTAR) 100 unit/mL (3 mL) subcutaneous pen, Inject 25 Units under the skin nightly. 25 units at night  , Disp: , Rfl:   •  insulin lispro (HumaLOG) 100 unit/mL subcutaneous pen,  Inject under the skin as needed. Sliding scale during the day, Disp: , Rfl:   •  metoprolol tartrate (LOPRESSOR) 25 mg tablet, Take 25 mg by mouth 2 (two) times a day., Disp: , Rfl:   •  nitroglycerin (NITROSTAT) 0.4 mg SL tablet, Place 0.4 mg under the tongue every 5 (five) minutes as needed for chest pain., Disp: , Rfl:   •  valsartan (DIOVAN) 320 mg tablet, Take 320 mg by mouth once daily., Disp: , Rfl:   •  vit C/E/Zn/coppr/lutein/zeaxan (PRESERVISION AREDS-2 ORAL), Take by mouth daily., Disp: , Rfl:   •  clopidogrel (PLAVIX) 75 mg tablet, Take 75 mg by mouth daily., Disp: , Rfl:   •  doxycycline hyclate (VIBRAMYCIN) 100 mg capsule, Take 1 capsule (100 mg total) by mouth 2 (two) times a day for 10 days., Disp: 20 capsule, Rfl: 0  •  lidocaine (XYLOCAINE) 2 % jelly, APPLY TO LEFT LEG WOUND DAILY AS DIRECTED, Disp: , Rfl: 2    Allergies: is allergic to bacitracin; ciprofloxacin; neosporin ([id number]) [neomycin-bacitracnzn-polymyxnb]; and penicillin.     Review of Systems   Constitutional: Positive for fatigue.   Cardiovascular: Positive for leg swelling.   Musculoskeletal: Positive for gait problem.   Skin: Positive for wound.   Neurological: Positive for weakness.   All other systems reviewed and are negative.    Objective   Visit Vitals  Pulse 80   Temp 36.8 °C (98.3 °F) (Oral)   Resp 18       Physical Exam  Constitutional:       Appearance: Normal appearance.   HENT:      Head: Normocephalic.      Mouth/Throat:      Mouth: Mucous membranes are moist.   Eyes:      Conjunctiva/sclera: Conjunctivae normal.      Pupils: Pupils are equal, round, and reactive to light.   Neck:      Musculoskeletal: Neck supple.   Cardiovascular:      Rate and Rhythm: Normal rate.      Pulses: Normal pulses.   Pulmonary:      Effort: Pulmonary effort is normal.   Abdominal:      Palpations: Abdomen is soft.   Musculoskeletal: Normal range of motion.         General: Swelling present.   Skin:     General: Skin is warm.    Neurological:      General: No focal deficit present.      Mental Status: She is alert.      Motor: Weakness present.      Coordination: Coordination abnormal.      Gait: Gait abnormal.   Psychiatric:         Mood and Affect: Mood normal.         Right leg wound with surrounding erythema    Assessment/Plan    Diagnosis Plan   1. Wound of right lower extremity, initial encounter  doxycycline hyclate (VIBRAMYCIN) 100 mg capsule     Problem List Items Addressed This Visit        Musculoskeletal    Wound of right leg - Primary    Relevant Medications    doxycycline hyclate (VIBRAMYCIN) 100 mg capsule      This right leg wound will be dressed with daily changes of Medihoney paste, Aquacel and overlying gauze wrap and Tubigrip.  I am going to start her on doxycycline, 100 mg twice daily x10 days.    Lower extremity swelling treated with elevation and Tubigrip compression      Return in about 1 week (around 4/5/2021).     ALEXANDRIA Beltrán MD

## 2021-04-01 ENCOUNTER — IMMUNIZATION (OUTPATIENT)
Dept: IMMUNIZATION | Facility: CLINIC | Age: 86
End: 2021-04-01
Attending: FAMILY MEDICINE

## 2021-04-01 RX ORDER — SILVER SULFADIAZINE 10 G/1000G
CREAM TOPICAL DAILY
Qty: 50 G | Refills: 3 | Status: SHIPPED | OUTPATIENT
Start: 2021-04-01 | End: 2021-04-05 | Stop reason: SDUPTHER

## 2021-04-01 RX ORDER — SILVER SULFADIAZINE 10 G/1000G
CREAM TOPICAL
COMMUNITY
Start: 2021-03-09 | End: 2021-04-01 | Stop reason: SDUPTHER

## 2021-04-01 NOTE — PROGRESS NOTES
"4/1/21 0915    Received call from Bandar Kirkpatrick  to report that her mom was having \"excruciating pain\" in her leg.  She described that her mom was \"shaking\" from pain.  Bandar denied that her mom had fever or chills.  I instructed Benjamin to stop the Medihoney and called Dr. Adler.  He instructed that she resume the use of silvadene ointment to the wound daily and a re-order was sent to the pharmacy.    I offered an appointment for her mom tomorrow but she declined.  We did move her appointment from Thursday 4/8/21 to 4/5/21 per Dr. Adler request and Bandar is in agreement.  Bandar stated that her mom is tolerating the oral antibiotic without any issues.  "

## 2021-04-05 ENCOUNTER — OFFICE VISIT (OUTPATIENT)
Dept: WOUND CARE | Facility: HOSPITAL | Age: 86
End: 2021-04-05
Attending: PLASTIC SURGERY
Payer: MEDICARE

## 2021-04-05 DIAGNOSIS — S81.801D WOUND OF RIGHT LOWER EXTREMITY, SUBSEQUENT ENCOUNTER: Primary | ICD-10-CM

## 2021-04-05 DIAGNOSIS — S81.801A WOUND OF RIGHT LOWER EXTREMITY, INITIAL ENCOUNTER: ICD-10-CM

## 2021-04-05 DIAGNOSIS — R60.0 LEG EDEMA: ICD-10-CM

## 2021-04-05 PROCEDURE — G0463 HOSPITAL OUTPT CLINIC VISIT: HCPCS | Performed by: PLASTIC SURGERY

## 2021-04-05 PROCEDURE — 99213 OFFICE O/P EST LOW 20 MIN: CPT | Performed by: PLASTIC SURGERY

## 2021-04-05 PROCEDURE — G0463 HOSPITAL OUTPT CLINIC VISIT: HCPCS

## 2021-04-05 RX ORDER — SILVER SULFADIAZINE 10 G/1000G
CREAM TOPICAL DAILY
Qty: 50 G | Refills: 3 | Status: SHIPPED | OUTPATIENT
Start: 2021-04-05

## 2021-04-05 ASSESSMENT — ENCOUNTER SYMPTOMS
WOUND: 1
FATIGUE: 1
WEAKNESS: 1

## 2021-04-05 NOTE — PROGRESS NOTES
Patient ID: Shahrzad Feng                              : 1928  MRN: [id number]                                            Visit Date: 2021  Encounter Provider: ALEXANDRIA Beltrán  Referring Provider: No ref. provider found    Subjective      HPI  Shahrzad Feng is a 89 y.o. old female with a chief compliant of Wound Check   presenting today for : Treatment of traumatic wound right lower extremity.  She had been ordered Medihoney paste dressing changes, but these were too painful.  She went back to Silvadene daily dressing changes      The following have been reviewed and updated as appropriate in this visit:       Past Medical History:  has a past medical history of Breast cancer (CMS/[facility]), DUB (dysfunctional uterine bleeding), GERD (gastroesophageal reflux disease), Glaucoma, Macular degeneration, MI (myocardial infarction) (CMS/[facility]), and Type 2 diabetes mellitus (CMS/[facility]).  Past Surgical History:  has a past surgical history that includes Breast lumpectomy (Left, 2015); Appendectomy; Coronary angioplasty with stent; and Hysterectomy.  Social History:  reports that she has quit smoking. She has never used smokeless tobacco. She reports that she does not drink alcohol or use drugs.  Family History: family history includes Colon cancer in her biological mother.  Medications:   Current Outpatient Medications:   •  amLODIPine (NORVASC) 2.5 mg tablet, Take 2.5 mg by mouth once daily., Disp: , Rfl: 1  •  aspirin 81 mg enteric coated tablet, Take 81 mg by mouth daily., Disp: , Rfl:   •  atorvastatin (LIPITOR) 40 mg tablet, Take 40 mg by mouth once daily., Disp: , Rfl: 2  •  clopidogrel (PLAVIX) 75 mg tablet, Take 75 mg by mouth daily., Disp: , Rfl:   •  doxycycline hyclate (VIBRAMYCIN) 100 mg capsule, Take 1 capsule (100 mg total) by mouth 2 (two) times a day for 10 days., Disp: 20 capsule, Rfl: 0  •  gabapentin (NEURONTIN) 100 mg capsule, as needed. , Disp: , Rfl: 1  •  insulin glargine (LANTUS  SOLOSTAR) 100 unit/mL (3 mL) subcutaneous pen, Inject 25 Units under the skin nightly. 25 units at night  , Disp: , Rfl:   •  insulin lispro (HumaLOG) 100 unit/mL subcutaneous pen, Inject under the skin as needed. Sliding scale during the day, Disp: , Rfl:   •  lidocaine (XYLOCAINE) 2 % jelly, APPLY TO LEFT LEG WOUND DAILY AS DIRECTED, Disp: , Rfl: 2  •  metoprolol tartrate (LOPRESSOR) 25 mg tablet, Take 25 mg by mouth 2 (two) times a day., Disp: , Rfl:   •  nitroglycerin (NITROSTAT) 0.4 mg SL tablet, Place 0.4 mg under the tongue every 5 (five) minutes as needed for chest pain., Disp: , Rfl:   •  silver sulfadiazine (SILVADENE) 1 % cream, Apply topically daily. To leg wound, Disp: 50 g, Rfl: 3  •  valsartan (DIOVAN) 320 mg tablet, Take 320 mg by mouth once daily., Disp: , Rfl:   •  vit C/E/Zn/coppr/lutein/zeaxan (PRESERVISION AREDS-2 ORAL), Take by mouth daily., Disp: , Rfl:     Allergies: is allergic to bacitracin; ciprofloxacin; neosporin ([id number]) [neomycin-bacitracnzn-polymyxnb]; and penicillin.     Review of Systems   Constitutional: Positive for fatigue.   Cardiovascular: Positive for leg swelling.   Musculoskeletal: Positive for gait problem.   Skin: Positive for wound.   Neurological: Positive for weakness.   All other systems reviewed and are negative.    Objective   There were no vitals taken for this visit.    Physical Exam  Constitutional:       Appearance: Normal appearance.   HENT:      Head: Normocephalic.      Mouth/Throat:      Mouth: Mucous membranes are moist.   Eyes:      Conjunctiva/sclera: Conjunctivae normal.      Pupils: Pupils are equal, round, and reactive to light.   Neck:      Musculoskeletal: Neck supple.   Cardiovascular:      Rate and Rhythm: Normal rate.      Pulses: Normal pulses.   Pulmonary:      Effort: Pulmonary effort is normal.   Abdominal:      Palpations: Abdomen is soft.   Musculoskeletal: Normal range of motion.         General: Swelling present.   Skin:     General:  Skin is warm.   Neurological:      General: No focal deficit present.      Mental Status: She is alert and oriented to person, place, and time.      Motor: Weakness present.      Coordination: Coordination abnormal.      Gait: Gait abnormal.   Psychiatric:         Mood and Affect: Mood normal.       Right leg wound with eschar.  There is less erythema.  Eschar is removed with 15 blade      Assessment/Plan    Diagnosis Plan   1. Wound of right lower extremity, subsequent encounter     2. Leg edema       Problem List Items Addressed This Visit        Musculoskeletal    Wound of right leg - Primary    Leg edema      Continue with daily dressing changes of SSD, overlying gauze and Tubigrip.    Lower extremity swelling treated with elevation and Tubigrip compression    No follow-ups on file.     ALEXANDRIA Beltrán MD

## 2021-04-05 NOTE — PATIENT INSTRUCTIONS
[facility]aling Center Instructions    MEDICATIONS     Medication Note  Continue present medications as prescribed by the [facility]aling Center or other physicians you see. To avoid any problems keep the [facility]aling Center informed each visit of any medications changes that occur.     WOUND CARE     Clean Wound with: Soap and Water   Treatment 1   Location: right leg  Dressing: Apply Silvadene to wound, cover with Aquacel, gauze, and matthew.  Wear tubigrip stocking.  Dressing Care Frequency: Daily  Care Provider: Family and Visiting Nurse     Visiting nurse-please order supplies for patient.  Please take oral antibiotics as prescribed.               Basic Principles      • Wash your hands thoroughly with soap and water and after each dressing change. If someone other than the patient changes the dressing, it’s best to wear disposable gloves.   • Do not get the wound or dressing wet.   • To shower: remove the dressing, shower with soap and water (including washing the wound - do not use a washcloth), air dry, then redress the wound.  • Do not take a tub bath  • Keep all your dressings in a clean, covered container at home to avoid dust and contamination.  • Discard used dressings in a plastic bag or covered trash container.  • Check your wound and the surrounding skin at each dressing change for redness, warmth, swelling, increased pain, foul odor, fever, pus or abnormal drainage or discharge.  • Notify Wound Healing Center if any of these changes occur - [phone].        Nutrition  • Eat a well, balanced diet with adequate protein to support wound healing.   • Take a multivitamin every day. Adequate nutrition supports healing and new tissue growth.  • All diabetic patients should strive to keep blood sugars within a normal, practical range.   • Elevated blood sugars can delay your wound healing.        ACTIVITY     Elevate legs above level of heart   Elevate your legs above the level of your heart for specific  time periods during the day on several firm pillows or a foam leg wedge  Use of a recliner chair at home can often help in the appropriate elevation of your legs above the level of your heart  Normal activity then rest and elevation  May shower  May walk    MOBILITY     Walker

## 2021-04-15 ENCOUNTER — OFFICE VISIT (OUTPATIENT)
Dept: WOUND CARE | Facility: HOSPITAL | Age: 86
End: 2021-04-15
Attending: PLASTIC SURGERY
Payer: MEDICARE

## 2021-04-15 DIAGNOSIS — R60.0 LEG EDEMA: ICD-10-CM

## 2021-04-15 DIAGNOSIS — S81.801D WOUND OF RIGHT LOWER EXTREMITY, SUBSEQUENT ENCOUNTER: Primary | ICD-10-CM

## 2021-04-15 PROCEDURE — 99213 OFFICE O/P EST LOW 20 MIN: CPT | Performed by: PLASTIC SURGERY

## 2021-04-15 PROCEDURE — G0463 HOSPITAL OUTPT CLINIC VISIT: HCPCS

## 2021-04-15 PROCEDURE — 27200105 HC AQUA CELL 4X4

## 2021-04-15 PROCEDURE — G0463 HOSPITAL OUTPT CLINIC VISIT: HCPCS | Performed by: PLASTIC SURGERY

## 2021-04-15 ASSESSMENT — ENCOUNTER SYMPTOMS
WEAKNESS: 1
WOUND: 1
FATIGUE: 1

## 2021-04-15 NOTE — PROGRESS NOTES
Patient ID: Shahrzad Feng                              : 1928  MRN: [id number]                                            Visit Date: 4/15/2021  Encounter Provider: ALEXANDRIA Beltrán  Referring Provider: No ref. provider found    Subjective      HPI  Shahrzad Feng is a 89 y.o. old female with a chief compliant of No chief complaint on file.   presenting today for : Treatment of traumatic wound right lower extremity with SSD cream and Tubigrip compression  The following have been reviewed and updated as appropriate in this visit:       Past Medical History:  has a past medical history of Breast cancer (CMS/[facility]), DUB (dysfunctional uterine bleeding), GERD (gastroesophageal reflux disease), Glaucoma, Macular degeneration, MI (myocardial infarction) (CMS/[facility]), and Type 2 diabetes mellitus (CMS/[facility]).  Past Surgical History:  has a past surgical history that includes Breast lumpectomy (Left, 2015); Appendectomy; Coronary angioplasty with stent; and Hysterectomy.  Social History:  reports that she has quit smoking. She has never used smokeless tobacco. She reports that she does not drink alcohol and does not use drugs.  Family History: family history includes Colon cancer in her biological mother.  Medications:   Current Outpatient Medications:   •  amLODIPine (NORVASC) 2.5 mg tablet, Take 2.5 mg by mouth once daily., Disp: , Rfl: 1  •  aspirin 81 mg enteric coated tablet, Take 81 mg by mouth daily., Disp: , Rfl:   •  atorvastatin (LIPITOR) 40 mg tablet, Take 40 mg by mouth once daily., Disp: , Rfl: 2  •  clopidogrel (PLAVIX) 75 mg tablet, Take 75 mg by mouth daily., Disp: , Rfl:   •  gabapentin (NEURONTIN) 100 mg capsule, as needed. , Disp: , Rfl: 1  •  insulin glargine (LANTUS SOLOSTAR) 100 unit/mL (3 mL) subcutaneous pen, Inject 25 Units under the skin nightly. 25 units at night  , Disp: , Rfl:   •  insulin lispro (HumaLOG) 100 unit/mL subcutaneous pen, Inject under the skin as needed. Sliding scale  during the day, Disp: , Rfl:   •  lidocaine (XYLOCAINE) 2 % jelly, APPLY TO LEFT LEG WOUND DAILY AS DIRECTED, Disp: , Rfl: 2  •  metoprolol tartrate (LOPRESSOR) 25 mg tablet, Take 25 mg by mouth 2 (two) times a day., Disp: , Rfl:   •  nitroglycerin (NITROSTAT) 0.4 mg SL tablet, Place 0.4 mg under the tongue every 5 (five) minutes as needed for chest pain., Disp: , Rfl:   •  silver sulfadiazine (SILVADENE) 1 % cream, Apply topically daily. To leg wound, Disp: 50 g, Rfl: 3  •  valsartan (DIOVAN) 320 mg tablet, Take 320 mg by mouth once daily., Disp: , Rfl:   •  vit C/E/Zn/coppr/lutein/zeaxan (PRESERVISION AREDS-2 ORAL), Take by mouth daily., Disp: , Rfl:     Allergies: is allergic to bacitracin, ciprofloxacin, neosporin (ojc-udm-mbqml) [neomycin-bacitracnzn-polymyxnb], and penicillin.     Review of Systems   Constitutional: Positive for fatigue.   Cardiovascular: Positive for leg swelling.   Musculoskeletal: Positive for gait problem.   Skin: Positive for wound.   Neurological: Positive for weakness.   All other systems reviewed and are negative.    Objective   There were no vitals taken for this visit.    Physical Exam  Constitutional:       Appearance: Normal appearance.   HENT:      Head: Normocephalic.      Mouth/Throat:      Mouth: Mucous membranes are moist.   Eyes:      Conjunctiva/sclera: Conjunctivae normal.      Pupils: Pupils are equal, round, and reactive to light.   Cardiovascular:      Rate and Rhythm: Normal rate.      Pulses: Normal pulses.   Pulmonary:      Effort: Pulmonary effort is normal.   Abdominal:      Palpations: Abdomen is soft.   Musculoskeletal:         General: Swelling present. Normal range of motion.      Cervical back: Neck supple.   Skin:     General: Skin is warm.   Neurological:      General: No focal deficit present.      Mental Status: She is alert and oriented to person, place, and time.      Motor: Weakness present.      Coordination: Coordination abnormal.      Gait: Gait  abnormal.   Psychiatric:         Mood and Affect: Mood normal.       Right leg wound is clean up with curette.  Quite a bit of healing now.  No sign of infection       Assessment/Plan    Diagnosis Plan   1. Wound of right lower extremity, subsequent encounter     2. Leg edema       Problem List Items Addressed This Visit        Musculoskeletal    Wound of right leg - Primary    Leg edema      Continue with SSD cream resting changes daily.    Lower extremity swelling treated with elevation, fluid restriction and Tubigrip compression    No follow-ups on file.     ALEXANDRIA Beltrán MD

## 2021-04-15 NOTE — PATIENT INSTRUCTIONS
W[facility]aling Center Instructions    MEDICATIONS     Medication Note  Continue present medications as prescribed by the [facility]ound Healing Center or other physicians you see. To avoid any problems keep the [facility]ound [facility]aling Center informed each visit of any medications changes that occur.   May take gabapentin 100mg in AM in addition to 200 at bedtime  WOUND CARE     Clean Wound with: Soap and Water   Treatment 1   Location: right leg  Dressing: Apply Silvadene to wound, cover with Aquacel, gauze, and matthew.  Wear tubigrip stocking.  Dressing Care Frequency: Daily  Care Provider: Family and Visiting Nurse     Visiting nurse-please order supplies for patient.  BP today 118/57 Heart rate 87               Basic Principles      • Wash your hands thoroughly with soap and water and after each dressing change. If someone other than the patient changes the dressing, it’s best to wear disposable gloves.   • Do not get the wound or dressing wet.   • To shower: remove the dressing, shower with soap and water (including washing the wound - do not use a washcloth), air dry, then redress the wound.  • Do not take a tub bath  • Keep all your dressings in a clean, covered container at home to avoid dust and contamination.  • Discard used dressings in a plastic bag or covered trash container.  • Check your wound and the surrounding skin at each dressing change for redness, warmth, swelling, increased pain, foul odor, fever, pus or abnormal drainage or discharge.  • Notify Wound Healing Center if any of these changes occur - [phone].        Nutrition  • Eat a well, balanced diet with adequate protein to support wound healing.   • Take a multivitamin every day. Adequate nutrition supports healing and new tissue growth.  • All diabetic patients should strive to keep blood sugars within a normal, practical range.   • Elevated blood sugars can delay your wound healing.        ACTIVITY     Elevate legs above level of heart   Elevate your legs  above the level of your heart for specific time periods during the day on several firm pillows or a foam leg wedge  Use of a recliner chair at home can often help in the appropriate elevation of your legs above the level of your heart  Normal activity then rest and elevation  May shower  May walk    MOBILITY     Walker

## 2021-04-27 ENCOUNTER — APPOINTMENT (EMERGENCY)
Dept: CARDIOLOGY | Facility: HOSPITAL | Age: 86
End: 2021-04-27
Attending: EMERGENCY MEDICINE
Payer: MEDICARE

## 2021-04-27 ENCOUNTER — APPOINTMENT (EMERGENCY)
Dept: RADIOLOGY | Facility: HOSPITAL | Age: 86
End: 2021-04-27
Attending: EMERGENCY MEDICINE
Payer: MEDICARE

## 2021-04-27 ENCOUNTER — HOSPITAL ENCOUNTER (EMERGENCY)
Facility: HOSPITAL | Age: 86
Discharge: HOME | End: 2021-04-27
Attending: EMERGENCY MEDICINE
Payer: MEDICARE

## 2021-04-27 VITALS
WEIGHT: 140 LBS | DIASTOLIC BLOOD PRESSURE: 50 MMHG | OXYGEN SATURATION: 96 % | BODY MASS INDEX: 25.76 KG/M2 | SYSTOLIC BLOOD PRESSURE: 110 MMHG | HEIGHT: 62 IN | HEART RATE: 66 BPM | TEMPERATURE: 98.1 F | RESPIRATION RATE: 17 BRPM

## 2021-04-27 DIAGNOSIS — M79.2 NEURALGIA: Primary | ICD-10-CM

## 2021-04-27 PROCEDURE — 99284 EMERGENCY DEPT VISIT MOD MDM: CPT | Mod: 25

## 2021-04-27 PROCEDURE — 73590 X-RAY EXAM OF LOWER LEG: CPT | Mod: RT

## 2021-04-27 PROCEDURE — 93971 EXTREMITY STUDY: CPT | Mod: 26,RT | Performed by: SURGERY

## 2021-04-27 PROCEDURE — 93971 EXTREMITY STUDY: CPT | Mod: RT

## 2021-04-27 RX ORDER — OXYCODONE AND ACETAMINOPHEN 5; 325 MG/1; MG/1
TABLET ORAL
COMMUNITY
Start: 2021-04-07

## 2021-04-27 ASSESSMENT — ENCOUNTER SYMPTOMS
NAUSEA: 0
WOUND: 1
WEAKNESS: 0
FEVER: 0
VOMITING: 0
CHILLS: 0
NUMBNESS: 0
DIARRHEA: 0
JOINT SWELLING: 0
ARTHRALGIAS: 0

## 2021-04-27 NOTE — ED PROVIDER NOTES
Emergency Medicine Note  HPI   HISTORY OF PRESENT ILLNESS     89 yo F PMH DM CAD GERD breast CA presents to ED accompanied by daughter for evaluation of RLE pain.  Per daughter and patient, patient has had a wound to her right lower leg x 2 months being followed by wound care.  Daughter states most recently had appointment 4/15. Admits wound has improved significant but at every visit the provider scrapes the wound and that night patient will have pain in her lower leg near the area of the wound.  Patient did skip last week's appointment but has appointment in 2 days. Pt has had recurrent pain from mid calf to ankle. Last night had the pain around 2:30AM which caused pt to scream in pain per her .  Pt is prescribed percocet for pain and last took around 0700AM today. Daughter notes patient is also prescribed gabapentin for nerve pain but she does not like patient to take it when she is taking percocet as it increases her fall risk. Daughter states today the leg looked tighter. Patient and daughter denies redness or discharge to wound, fever, chills.       Lower Extremity Issue  Associated symptoms: no fever          Patient History   PAST HISTORY     Reviewed from Nursing Triage:      Past Medical History:   Diagnosis Date   • Breast cancer (CMS/[facility])    • DUB (dysfunctional uterine bleeding)    • GERD (gastroesophageal reflux disease)    • Glaucoma    • Macular degeneration    • MI (myocardial infarction) (CMS/[facility])     6/2010   • Type 2 diabetes mellitus (CMS/[facility])        Past Surgical History:   Procedure Laterality Date   • APPENDECTOMY     • BREAST LUMPECTOMY Left 2015   • CORONARY ANGIOPLASTY WITH STENT PLACEMENT     • HYSTERECTOMY      subtotal       Family History   Problem Relation Age of Onset   • Colon cancer Biological Mother        Social History     Tobacco Use   • Smoking status: Former Smoker   • Smokeless tobacco: Never Used   Substance Use Topics   • Alcohol use: No   • Drug use: No          Review of Systems   REVIEW OF SYSTEMS     Review of Systems   Constitutional: Negative for chills and fever.   Cardiovascular: Positive for leg swelling.   Gastrointestinal: Negative for diarrhea, nausea and vomiting.   Musculoskeletal: Negative for arthralgias and joint swelling.   Skin: Positive for wound.   Neurological: Negative for weakness and numbness.         VITALS     ED Vitals    Date/Time Temp Pulse Resp BP SpO2 [facility]   04/27/21 0829 36.7 °C (98.1 °F) 69 18 102/44 95 % EW        Pulse Ox %: 95 % (04/27/21 1106)  Pulse Ox Interpretation: Normal (04/27/21 1106)           Physical Exam   PHYSICAL EXAM     Physical Exam  Vitals and nursing note reviewed.   Constitutional:       General: She is not in acute distress.     Appearance: She is not toxic-appearing.   Cardiovascular:      Pulses: Normal pulses.   Pulmonary:      Effort: Pulmonary effort is normal.   Musculoskeletal:      Right knee: No swelling, erythema or bony tenderness. Normal range of motion. No tenderness.      Right lower leg: No swelling, deformity, tenderness or bony tenderness. No edema.      Right ankle: No swelling, deformity or ecchymosis. No tenderness. Normal range of motion.      Right Achilles Tendon: Normal.      Right foot: Normal range of motion and normal capillary refill. No swelling, deformity, tenderness or bony tenderness.        Legs:    Skin:     General: Skin is warm.      Capillary Refill: Capillary refill takes less than 2 seconds.   Neurological:      Mental Status: She is alert and oriented to person, place, and time.   Psychiatric:         Mood and Affect: Mood normal.           PROCEDURES     Procedures     DATA     Results     None          Imaging Results          X-RAY TIBIA FIBULA RIGHT 2 VIEWS (Final result)  Result time 04/27/21 11:12:02    Final result                 Impression:    IMPRESSION: No definite fracture is identified.  Degenerative changes seen at  the ankle on AP view only.              Narrative:    CLINICAL HISTORY: lower leg pain   .    Comparison: None available.    COMMENT: AP and lateral views of the right tibia and fibula were obtained.    No definite fracture is identified.  There is no subluxation or dislocation. No  radiopaque foreign bodies are identified. Degenerative changes at the ankle  joint.    No lateral view obtained at the level of the ankle.    MRI can be obtained for further evaluation if clinically indicated.                               Ultrasound venous leg right (Preliminary result)      Collection Time Result Time BSA     04/27/21 09:56:43 1.67               Preliminary result                 Initial Result:    No evidence of right lower extremity DVT or SVT.  Normal contralateral common femoral vein.                               No orders to display       Scoring tools                                 ED Course & MDM   MDM / ED COURSE and CLINICAL IMPRESSIONS     MDM    ED Course as of Apr 27 1116   Tue Apr 27, 2021   1108 US negative for a DVT  Xray NAD  Description of pain seems to be consistent with nerve pain. Recommended gabapentin use with PCP    [SS]      ED Course User Index  [SS] Mary Hastings PA C         Clinical Impressions as of Apr 27 1116   Neuralgia            Mary Hastings PA C  04/27/21 1116

## 2021-04-27 NOTE — ED ATTESTATION NOTE
The patient was evaluated and managed by the physician assistant / nurse practitioner.     Fredis Koch MD  04/27/21 6959

## 2021-04-27 NOTE — DISCHARGE INSTRUCTIONS
Your ultrasound is negative for a blood clot  Your xray shows no acute bony abnormality  The description of your pain sounds like it is nerve pain and can be treated with gabapentin -- discuss dosing with your primary care provider.  Keep leg elevated at rest

## 2021-04-28 LAB — BSA FOR ECHO PROCEDURE: 1.67 M2
